# Patient Record
Sex: FEMALE | Race: BLACK OR AFRICAN AMERICAN | NOT HISPANIC OR LATINO | URBAN - METROPOLITAN AREA
[De-identification: names, ages, dates, MRNs, and addresses within clinical notes are randomized per-mention and may not be internally consistent; named-entity substitution may affect disease eponyms.]

---

## 2017-04-21 ENCOUNTER — OUTPATIENT (OUTPATIENT)
Dept: OUTPATIENT SERVICES | Facility: HOSPITAL | Age: 32
LOS: 1 days | End: 2017-04-21
Payer: COMMERCIAL

## 2017-04-21 PROCEDURE — 76641 ULTRASOUND BREAST COMPLETE: CPT

## 2017-04-21 PROCEDURE — 76641 ULTRASOUND BREAST COMPLETE: CPT | Mod: 26,50

## 2017-04-28 ENCOUNTER — OUTPATIENT (OUTPATIENT)
Dept: OUTPATIENT SERVICES | Facility: HOSPITAL | Age: 32
LOS: 1 days | End: 2017-04-28
Payer: COMMERCIAL

## 2017-04-28 PROCEDURE — 19083 BX BREAST 1ST LESION US IMAG: CPT | Mod: LT

## 2017-04-28 PROCEDURE — A4648: CPT

## 2017-04-28 PROCEDURE — 88305 TISSUE EXAM BY PATHOLOGIST: CPT

## 2017-04-28 PROCEDURE — 19083 BX BREAST 1ST LESION US IMAG: CPT

## 2017-09-20 ENCOUNTER — OUTPATIENT (OUTPATIENT)
Dept: OUTPATIENT SERVICES | Facility: HOSPITAL | Age: 32
LOS: 1 days | End: 2017-09-20
Payer: COMMERCIAL

## 2017-09-20 DIAGNOSIS — Z3A.00 WEEKS OF GESTATION OF PREGNANCY NOT SPECIFIED: ICD-10-CM

## 2017-09-20 DIAGNOSIS — O26.899 OTHER SPECIFIED PREGNANCY RELATED CONDITIONS, UNSPECIFIED TRIMESTER: ICD-10-CM

## 2017-09-20 LAB
APPEARANCE UR: CLEAR — SIGNIFICANT CHANGE UP
BILIRUB UR-MCNC: NEGATIVE — SIGNIFICANT CHANGE UP
COLOR SPEC: YELLOW — SIGNIFICANT CHANGE UP
DIFF PNL FLD: NEGATIVE — SIGNIFICANT CHANGE UP
GLUCOSE UR QL: NEGATIVE — SIGNIFICANT CHANGE UP
KETONES UR-MCNC: NEGATIVE — SIGNIFICANT CHANGE UP
LEUKOCYTE ESTERASE UR-ACNC: NEGATIVE — SIGNIFICANT CHANGE UP
NITRITE UR-MCNC: NEGATIVE — SIGNIFICANT CHANGE UP
PH UR: 7 — SIGNIFICANT CHANGE UP (ref 5–8)
PROT UR-MCNC: NEGATIVE MG/DL — SIGNIFICANT CHANGE UP
SP GR SPEC: 1.01 — SIGNIFICANT CHANGE UP (ref 1–1.03)
UROBILINOGEN FLD QL: 0.2 E.U./DL — SIGNIFICANT CHANGE UP

## 2017-09-20 PROCEDURE — 99214 OFFICE O/P EST MOD 30 MIN: CPT

## 2017-09-20 PROCEDURE — 81003 URINALYSIS AUTO W/O SCOPE: CPT

## 2017-09-20 PROCEDURE — 76818 FETAL BIOPHYS PROFILE W/NST: CPT

## 2017-09-20 PROCEDURE — 87086 URINE CULTURE/COLONY COUNT: CPT

## 2017-09-22 LAB
CULTURE RESULTS: SIGNIFICANT CHANGE UP
SPECIMEN SOURCE: SIGNIFICANT CHANGE UP

## 2017-09-25 DIAGNOSIS — O09.613 SUPERVISION OF YOUNG PRIMIGRAVIDA, THIRD TRIMESTER: ICD-10-CM

## 2017-10-22 ENCOUNTER — INPATIENT (INPATIENT)
Facility: HOSPITAL | Age: 32
LOS: 1 days | Discharge: HOME | End: 2017-10-24
Attending: OBSTETRICS & GYNECOLOGY | Admitting: OBSTETRICS & GYNECOLOGY
Payer: COMMERCIAL

## 2017-10-22 VITALS — WEIGHT: 189.6 LBS | HEIGHT: 67 IN

## 2017-10-22 DIAGNOSIS — M54.31 SCIATICA, RIGHT SIDE: ICD-10-CM

## 2017-10-22 DIAGNOSIS — Z3A.00 WEEKS OF GESTATION OF PREGNANCY NOT SPECIFIED: ICD-10-CM

## 2017-10-22 DIAGNOSIS — O26.899 OTHER SPECIFIED PREGNANCY RELATED CONDITIONS, UNSPECIFIED TRIMESTER: ICD-10-CM

## 2017-10-22 LAB
ALBUMIN SERPL ELPH-MCNC: 3.6 G/DL — SIGNIFICANT CHANGE UP (ref 3.3–5)
ALP SERPL-CCNC: 130 U/L — HIGH (ref 40–120)
ALT FLD-CCNC: 36 U/L — SIGNIFICANT CHANGE UP (ref 10–45)
ANION GAP SERPL CALC-SCNC: 17 MMOL/L — SIGNIFICANT CHANGE UP (ref 5–17)
APPEARANCE UR: CLEAR — SIGNIFICANT CHANGE UP
AST SERPL-CCNC: 45 U/L — HIGH (ref 10–40)
BASOPHILS NFR BLD AUTO: 0.2 % — SIGNIFICANT CHANGE UP (ref 0–2)
BILIRUB SERPL-MCNC: 0.2 MG/DL — SIGNIFICANT CHANGE UP (ref 0.2–1.2)
BILIRUB UR-MCNC: NEGATIVE — SIGNIFICANT CHANGE UP
BLD GP AB SCN SERPL QL: NEGATIVE — SIGNIFICANT CHANGE UP
BUN SERPL-MCNC: 10 MG/DL — SIGNIFICANT CHANGE UP (ref 7–23)
CALCIUM SERPL-MCNC: 9.1 MG/DL — SIGNIFICANT CHANGE UP (ref 8.4–10.5)
CHLORIDE SERPL-SCNC: 98 MMOL/L — SIGNIFICANT CHANGE UP (ref 96–108)
CO2 SERPL-SCNC: 21 MMOL/L — LOW (ref 22–31)
COLOR SPEC: YELLOW — SIGNIFICANT CHANGE UP
CREAT SERPL-MCNC: 0.73 MG/DL — SIGNIFICANT CHANGE UP (ref 0.5–1.3)
DIFF PNL FLD: NEGATIVE — SIGNIFICANT CHANGE UP
EOSINOPHIL NFR BLD AUTO: 0.6 % — SIGNIFICANT CHANGE UP (ref 0–6)
GLUCOSE SERPL-MCNC: 101 MG/DL — HIGH (ref 70–99)
GLUCOSE UR QL: NEGATIVE — SIGNIFICANT CHANGE UP
HCT VFR BLD CALC: 37.5 % — SIGNIFICANT CHANGE UP (ref 34.5–45)
HGB BLD-MCNC: 12.2 G/DL — SIGNIFICANT CHANGE UP (ref 11.5–15.5)
KETONES UR-MCNC: 15 MG/DL
LDH SERPL L TO P-CCNC: 363 U/L — HIGH (ref 50–242)
LEUKOCYTE ESTERASE UR-ACNC: NEGATIVE — SIGNIFICANT CHANGE UP
LYMPHOCYTES # BLD AUTO: 20.5 % — SIGNIFICANT CHANGE UP (ref 13–44)
MCHC RBC-ENTMCNC: 27.7 PG — SIGNIFICANT CHANGE UP (ref 27–34)
MCHC RBC-ENTMCNC: 32.5 G/DL — SIGNIFICANT CHANGE UP (ref 32–36)
MCV RBC AUTO: 85 FL — SIGNIFICANT CHANGE UP (ref 80–100)
MONOCYTES NFR BLD AUTO: 9.3 % — SIGNIFICANT CHANGE UP (ref 2–14)
NEUTROPHILS NFR BLD AUTO: 69.4 % — SIGNIFICANT CHANGE UP (ref 43–77)
NITRITE UR-MCNC: NEGATIVE — SIGNIFICANT CHANGE UP
PH UR: 7 — SIGNIFICANT CHANGE UP (ref 5–8)
PLATELET # BLD AUTO: 146 K/UL — LOW (ref 150–400)
POTASSIUM SERPL-MCNC: 3.8 MMOL/L — SIGNIFICANT CHANGE UP (ref 3.5–5.3)
POTASSIUM SERPL-SCNC: 3.8 MMOL/L — SIGNIFICANT CHANGE UP (ref 3.5–5.3)
PROT SERPL-MCNC: 7 G/DL — SIGNIFICANT CHANGE UP (ref 6–8.3)
PROT UR-MCNC: (no result) MG/DL
RBC # BLD: 4.41 M/UL — SIGNIFICANT CHANGE UP (ref 3.8–5.2)
RBC # FLD: 12.9 % — SIGNIFICANT CHANGE UP (ref 10.3–16.9)
RH IG SCN BLD-IMP: POSITIVE — SIGNIFICANT CHANGE UP
RH IG SCN BLD-IMP: POSITIVE — SIGNIFICANT CHANGE UP
SODIUM SERPL-SCNC: 136 MMOL/L — SIGNIFICANT CHANGE UP (ref 135–145)
SP GR SPEC: 1.02 — SIGNIFICANT CHANGE UP (ref 1–1.03)
URATE SERPL-MCNC: 4.6 MG/DL — SIGNIFICANT CHANGE UP (ref 2.5–7)
UROBILINOGEN FLD QL: 0.2 E.U./DL — SIGNIFICANT CHANGE UP
WBC # BLD: 8.7 K/UL — SIGNIFICANT CHANGE UP (ref 3.8–10.5)
WBC # FLD AUTO: 8.7 K/UL — SIGNIFICANT CHANGE UP (ref 3.8–10.5)

## 2017-10-22 RX ORDER — OXYTOCIN 10 UNIT/ML
41.67 VIAL (ML) INJECTION
Qty: 20 | Refills: 0 | Status: DISCONTINUED | OUTPATIENT
Start: 2017-10-22 | End: 2017-10-24

## 2017-10-22 RX ORDER — SODIUM CHLORIDE 9 MG/ML
1000 INJECTION, SOLUTION INTRAVENOUS
Qty: 0 | Refills: 0 | Status: DISCONTINUED | OUTPATIENT
Start: 2017-10-22 | End: 2017-10-22

## 2017-10-22 RX ORDER — LANOLIN
1 OINTMENT (GRAM) TOPICAL EVERY 6 HOURS
Qty: 0 | Refills: 0 | Status: DISCONTINUED | OUTPATIENT
Start: 2017-10-22 | End: 2017-10-24

## 2017-10-22 RX ORDER — OXYTOCIN 10 UNIT/ML
333.33 VIAL (ML) INJECTION
Qty: 20 | Refills: 0 | Status: DISCONTINUED | OUTPATIENT
Start: 2017-10-22 | End: 2017-10-22

## 2017-10-22 RX ORDER — IBUPROFEN 200 MG
600 TABLET ORAL EVERY 6 HOURS
Qty: 0 | Refills: 0 | Status: DISCONTINUED | OUTPATIENT
Start: 2017-10-22 | End: 2017-10-24

## 2017-10-22 RX ORDER — ACETAMINOPHEN 500 MG
650 TABLET ORAL EVERY 6 HOURS
Qty: 0 | Refills: 0 | Status: DISCONTINUED | OUTPATIENT
Start: 2017-10-22 | End: 2017-10-24

## 2017-10-22 RX ORDER — DIBUCAINE 1 %
1 OINTMENT (GRAM) RECTAL EVERY 4 HOURS
Qty: 0 | Refills: 0 | Status: DISCONTINUED | OUTPATIENT
Start: 2017-10-22 | End: 2017-10-24

## 2017-10-22 RX ORDER — PENICILLIN G POTASSIUM 5000000 [IU]/1
5 POWDER, FOR SOLUTION INTRAMUSCULAR; INTRAPLEURAL; INTRATHECAL; INTRAVENOUS ONCE
Qty: 0 | Refills: 0 | Status: COMPLETED | OUTPATIENT
Start: 2017-10-22 | End: 2017-10-22

## 2017-10-22 RX ORDER — HYDROCORTISONE 1 %
1 OINTMENT (GRAM) TOPICAL EVERY 4 HOURS
Qty: 0 | Refills: 0 | Status: DISCONTINUED | OUTPATIENT
Start: 2017-10-22 | End: 2017-10-24

## 2017-10-22 RX ORDER — SODIUM CHLORIDE 9 MG/ML
1000 INJECTION, SOLUTION INTRAVENOUS ONCE
Qty: 0 | Refills: 0 | Status: COMPLETED | OUTPATIENT
Start: 2017-10-22 | End: 2017-10-22

## 2017-10-22 RX ORDER — PENICILLIN G POTASSIUM 5000000 [IU]/1
POWDER, FOR SOLUTION INTRAMUSCULAR; INTRAPLEURAL; INTRATHECAL; INTRAVENOUS
Qty: 0 | Refills: 0 | Status: DISCONTINUED | OUTPATIENT
Start: 2017-10-22 | End: 2017-10-24

## 2017-10-22 RX ORDER — PENICILLIN G POTASSIUM 5000000 [IU]/1
2.5 POWDER, FOR SOLUTION INTRAMUSCULAR; INTRAPLEURAL; INTRATHECAL; INTRAVENOUS EVERY 4 HOURS
Qty: 0 | Refills: 0 | Status: DISCONTINUED | OUTPATIENT
Start: 2017-10-22 | End: 2017-10-24

## 2017-10-22 RX ORDER — DOCUSATE SODIUM 100 MG
100 CAPSULE ORAL
Qty: 0 | Refills: 0 | Status: DISCONTINUED | OUTPATIENT
Start: 2017-10-22 | End: 2017-10-24

## 2017-10-22 RX ORDER — CITRIC ACID/SODIUM CITRATE 300-500 MG
15 SOLUTION, ORAL ORAL EVERY 4 HOURS
Qty: 0 | Refills: 0 | Status: DISCONTINUED | OUTPATIENT
Start: 2017-10-22 | End: 2017-10-22

## 2017-10-22 RX ORDER — SIMETHICONE 80 MG/1
80 TABLET, CHEWABLE ORAL EVERY 6 HOURS
Qty: 0 | Refills: 0 | Status: DISCONTINUED | OUTPATIENT
Start: 2017-10-22 | End: 2017-10-24

## 2017-10-22 RX ORDER — SODIUM CHLORIDE 9 MG/ML
3 INJECTION INTRAMUSCULAR; INTRAVENOUS; SUBCUTANEOUS EVERY 8 HOURS
Qty: 0 | Refills: 0 | Status: DISCONTINUED | OUTPATIENT
Start: 2017-10-22 | End: 2017-10-24

## 2017-10-22 RX ORDER — AER TRAVELER 0.5 G/1
1 SOLUTION RECTAL; TOPICAL EVERY 4 HOURS
Qty: 0 | Refills: 0 | Status: DISCONTINUED | OUTPATIENT
Start: 2017-10-22 | End: 2017-10-24

## 2017-10-22 RX ORDER — TETANUS TOXOID, REDUCED DIPHTHERIA TOXOID AND ACELLULAR PERTUSSIS VACCINE, ADSORBED 5; 2.5; 8; 8; 2.5 [IU]/.5ML; [IU]/.5ML; UG/.5ML; UG/.5ML; UG/.5ML
0.5 SUSPENSION INTRAMUSCULAR ONCE
Qty: 0 | Refills: 0 | Status: DISCONTINUED | OUTPATIENT
Start: 2017-10-22 | End: 2017-10-24

## 2017-10-22 RX ORDER — BENZOCAINE 10 %
1 GEL (GRAM) MUCOUS MEMBRANE EVERY 6 HOURS
Qty: 0 | Refills: 0 | Status: DISCONTINUED | OUTPATIENT
Start: 2017-10-22 | End: 2017-10-24

## 2017-10-22 RX ORDER — OXYCODONE AND ACETAMINOPHEN 5; 325 MG/1; MG/1
2 TABLET ORAL EVERY 6 HOURS
Qty: 0 | Refills: 0 | Status: DISCONTINUED | OUTPATIENT
Start: 2017-10-22 | End: 2017-10-24

## 2017-10-22 RX ORDER — OXYCODONE AND ACETAMINOPHEN 5; 325 MG/1; MG/1
1 TABLET ORAL
Qty: 0 | Refills: 0 | Status: DISCONTINUED | OUTPATIENT
Start: 2017-10-22 | End: 2017-10-22

## 2017-10-22 RX ADMIN — Medication 600 MILLIGRAM(S): at 13:44

## 2017-10-22 RX ADMIN — SODIUM CHLORIDE 3 MILLILITER(S): 9 INJECTION INTRAMUSCULAR; INTRAVENOUS; SUBCUTANEOUS at 16:57

## 2017-10-22 RX ADMIN — Medication 600 MILLIGRAM(S): at 14:15

## 2017-10-22 RX ADMIN — SODIUM CHLORIDE 125 MILLILITER(S): 9 INJECTION, SOLUTION INTRAVENOUS at 06:50

## 2017-10-22 RX ADMIN — Medication 600 MILLIGRAM(S): at 19:46

## 2017-10-22 RX ADMIN — PENICILLIN G POTASSIUM 200 MILLION UNIT(S): 5000000 POWDER, FOR SOLUTION INTRAMUSCULAR; INTRAPLEURAL; INTRATHECAL; INTRAVENOUS at 10:10

## 2017-10-22 RX ADMIN — Medication 1 TABLET(S): at 17:51

## 2017-10-22 RX ADMIN — Medication 1 SPRAY(S): at 19:46

## 2017-10-22 RX ADMIN — SODIUM CHLORIDE 125 MILLILITER(S): 9 INJECTION, SOLUTION INTRAVENOUS at 07:25

## 2017-10-22 RX ADMIN — Medication 1 APPLICATION(S): at 19:46

## 2017-10-22 RX ADMIN — Medication 650 MILLIGRAM(S): at 18:30

## 2017-10-22 RX ADMIN — Medication 650 MILLIGRAM(S): at 17:53

## 2017-10-22 RX ADMIN — Medication 125 MILLIUNIT(S)/MIN: at 10:38

## 2017-10-22 RX ADMIN — SODIUM CHLORIDE 2000 MILLILITER(S): 9 INJECTION, SOLUTION INTRAVENOUS at 06:01

## 2017-10-22 RX ADMIN — PENICILLIN G POTASSIUM 200 MILLION UNIT(S): 5000000 POWDER, FOR SOLUTION INTRAMUSCULAR; INTRAPLEURAL; INTRATHECAL; INTRAVENOUS at 06:18

## 2017-10-23 LAB
ALBUMIN SERPL ELPH-MCNC: 3.2 G/DL — LOW (ref 3.3–5)
ALP SERPL-CCNC: 97 U/L — SIGNIFICANT CHANGE UP (ref 40–120)
ALT FLD-CCNC: 59 U/L — HIGH (ref 10–45)
ANION GAP SERPL CALC-SCNC: 11 MMOL/L — SIGNIFICANT CHANGE UP (ref 5–17)
AST SERPL-CCNC: 68 U/L — HIGH (ref 10–40)
BILIRUB SERPL-MCNC: 0.2 MG/DL — SIGNIFICANT CHANGE UP (ref 0.2–1.2)
BUN SERPL-MCNC: 7 MG/DL — SIGNIFICANT CHANGE UP (ref 7–23)
CALCIUM SERPL-MCNC: 9.3 MG/DL — SIGNIFICANT CHANGE UP (ref 8.4–10.5)
CHLORIDE SERPL-SCNC: 97 MMOL/L — SIGNIFICANT CHANGE UP (ref 96–108)
CO2 SERPL-SCNC: 28 MMOL/L — SIGNIFICANT CHANGE UP (ref 22–31)
CREAT SERPL-MCNC: 0.8 MG/DL — SIGNIFICANT CHANGE UP (ref 0.5–1.3)
GLUCOSE SERPL-MCNC: 91 MG/DL — SIGNIFICANT CHANGE UP (ref 70–99)
HCT VFR BLD CALC: 35.9 % — SIGNIFICANT CHANGE UP (ref 34.5–45)
HGB BLD-MCNC: 11.5 G/DL — SIGNIFICANT CHANGE UP (ref 11.5–15.5)
LDH SERPL L TO P-CCNC: 352 U/L — HIGH (ref 50–242)
MCHC RBC-ENTMCNC: 27.7 PG — SIGNIFICANT CHANGE UP (ref 27–34)
MCHC RBC-ENTMCNC: 32 G/DL — SIGNIFICANT CHANGE UP (ref 32–36)
MCV RBC AUTO: 86.5 FL — SIGNIFICANT CHANGE UP (ref 80–100)
PLATELET # BLD AUTO: 141 K/UL — LOW (ref 150–400)
POTASSIUM SERPL-MCNC: 4.2 MMOL/L — SIGNIFICANT CHANGE UP (ref 3.5–5.3)
POTASSIUM SERPL-SCNC: 4.2 MMOL/L — SIGNIFICANT CHANGE UP (ref 3.5–5.3)
PROT SERPL-MCNC: 6.3 G/DL — SIGNIFICANT CHANGE UP (ref 6–8.3)
RBC # BLD: 4.15 M/UL — SIGNIFICANT CHANGE UP (ref 3.8–5.2)
RBC # FLD: 13 % — SIGNIFICANT CHANGE UP (ref 10.3–16.9)
SODIUM SERPL-SCNC: 136 MMOL/L — SIGNIFICANT CHANGE UP (ref 135–145)
T PALLIDUM AB TITR SER: NEGATIVE — SIGNIFICANT CHANGE UP
URATE SERPL-MCNC: 4.8 MG/DL — SIGNIFICANT CHANGE UP (ref 2.5–7)
WBC # BLD: 11.6 K/UL — HIGH (ref 3.8–10.5)
WBC # FLD AUTO: 11.6 K/UL — HIGH (ref 3.8–10.5)

## 2017-10-23 RX ORDER — LABETALOL HCL 100 MG
200 TABLET ORAL EVERY 12 HOURS
Qty: 0 | Refills: 0 | Status: DISCONTINUED | OUTPATIENT
Start: 2017-10-23 | End: 2017-10-24

## 2017-10-23 RX ADMIN — Medication 200 MILLIGRAM(S): at 21:13

## 2017-10-23 RX ADMIN — SODIUM CHLORIDE 3 MILLILITER(S): 9 INJECTION INTRAMUSCULAR; INTRAVENOUS; SUBCUTANEOUS at 01:03

## 2017-10-23 RX ADMIN — Medication 600 MILLIGRAM(S): at 20:30

## 2017-10-23 RX ADMIN — Medication 100 MILLIGRAM(S): at 00:05

## 2017-10-23 RX ADMIN — OXYCODONE AND ACETAMINOPHEN 2 TABLET(S): 5; 325 TABLET ORAL at 06:50

## 2017-10-23 RX ADMIN — OXYCODONE AND ACETAMINOPHEN 2 TABLET(S): 5; 325 TABLET ORAL at 18:37

## 2017-10-23 RX ADMIN — OXYCODONE AND ACETAMINOPHEN 2 TABLET(S): 5; 325 TABLET ORAL at 12:09

## 2017-10-23 RX ADMIN — Medication 100 MILLIGRAM(S): at 12:09

## 2017-10-23 RX ADMIN — OXYCODONE AND ACETAMINOPHEN 2 TABLET(S): 5; 325 TABLET ORAL at 13:00

## 2017-10-23 RX ADMIN — OXYCODONE AND ACETAMINOPHEN 2 TABLET(S): 5; 325 TABLET ORAL at 19:30

## 2017-10-23 RX ADMIN — Medication 1 TABLET(S): at 12:09

## 2017-10-23 RX ADMIN — SODIUM CHLORIDE 3 MILLILITER(S): 9 INJECTION INTRAMUSCULAR; INTRAVENOUS; SUBCUTANEOUS at 18:38

## 2017-10-23 RX ADMIN — OXYCODONE AND ACETAMINOPHEN 2 TABLET(S): 5; 325 TABLET ORAL at 06:09

## 2017-10-23 RX ADMIN — OXYCODONE AND ACETAMINOPHEN 2 TABLET(S): 5; 325 TABLET ORAL at 00:50

## 2017-10-23 RX ADMIN — OXYCODONE AND ACETAMINOPHEN 2 TABLET(S): 5; 325 TABLET ORAL at 00:05

## 2017-10-23 RX ADMIN — SODIUM CHLORIDE 3 MILLILITER(S): 9 INJECTION INTRAMUSCULAR; INTRAVENOUS; SUBCUTANEOUS at 05:47

## 2017-10-23 NOTE — PROGRESS NOTE ADULT - SUBJECTIVE AND OBJECTIVE BOX
Patient evaluated at bedside.  No acute events overnight.  She reports pain is well controlled with motrin and percocet.  She denies heavy vaginal bleeding or perineal discomfort.  She has been ambulating without assistance, voiding spontaneously, and is breastfeeding.  Denies headache, blurry vision, SOB, epigastric/RUQ pain.    Physical Exam:  T(C): 37 (10-23-17 @ 05:00), Max: 37 (10-23-17 @ 05:00)  HR: 73 (10-23-17 @ 05:00) (73 - 91)  BP: 131/86 (10-23-17 @ 05:00) (131/86 - 159/91)  RR: 18 (10-23-17 @ 05:00) (18 - 18)  SpO2: 97% (10-23-17 @ 05:00) (97% - 100%)  Wt(kg): --    GA: NAD, AAOx3  Abd: soft, nontender, nondistended, no rebound or guarding, uterus firm at midline,   fundus below umbilicus  : lochia WNL  Extremities: no swelling or calf tenderness                            12.2   8.7   )-----------( 146      ( 22 Oct 2017 06:17 )             37.5     10-22    136  |  98  |  10  ----------------------------<  101<H>  3.8   |  21<L>  |  0.73    Ca    9.1      22 Oct 2017 06:17    TPro  7.0  /  Alb  3.6  /  TBili  0.2  /  DBili  x   /  AST  45<H>  /  ALT  36  /  AlkPhos  130<H>  10-22

## 2017-10-23 NOTE — PROGRESS NOTE ADULT - ASSESSMENT
A/P  32y   s/p , PPD #1 with gHTN  ,stable  1.  Gestational hypertension -- mild range pressures overnight; will consider starting PO anti-hypertensive medication   2. Pain: well controlled on OPM  3. GI: Regular diet  4. : voiding spontaneously  5. DVT prophylaxis: Ambulation  6. Dispo: PPD 2, unless otherwise specified

## 2017-10-23 NOTE — PROGRESS NOTE ADULT - SUBJECTIVE AND OBJECTIVE BOX
Patient evaluated at bedside.   She reports pain is well controlled with Motrin.  She denies headache, dizziness, chest pain, palpitations, shortness of breathe, nausea, vomiting, heavy vaginal bleeding or perineal discomfort.  She has been ambulating without assistance, voiding spontaneously, and is breastfeeding.  Tolerating regular diet    Physical Exam:  VSS:   GA: NAD, A+0 x 3  CV: RRR  Pulm: CTAB  Breasts: soft, nontender, no palpable masses  Abd: + BS, soft, nontender, nondistended, no rebound or guarding, uterus firm at midline,   fb below umbilicus  : lochia WNL; Perineum: healing well  Extremities: no swelling or calf tenderness, reflexes +2 bilaterally                       12.2   8.7   )-----------( 146      ( 22 Oct 2017 06:17 )             37.5     10-22    136  |  98  |  10  ----------------------------<  101<H>  3.8   |  21<L>  |  0.73    Ca    9.1      22 Oct 2017 06:17    TPro  7.0  /  Alb  3.6  /  TBili  0.2  /  DBili  x   /  AST  45<H>  /  ALT  36  /  AlkPhos  130<H>  10-22    Assessment:   PPD #  1 S/P  over midline episiotomy  Plan:  Supportive Care  Diet as tolerated  Oral pain Meds

## 2017-10-24 ENCOUNTER — TRANSCRIPTION ENCOUNTER (OUTPATIENT)
Age: 32
End: 2017-10-24

## 2017-10-24 VITALS
RESPIRATION RATE: 18 BRPM | DIASTOLIC BLOOD PRESSURE: 80 MMHG | TEMPERATURE: 98 F | SYSTOLIC BLOOD PRESSURE: 117 MMHG | HEART RATE: 88 BPM | OXYGEN SATURATION: 99 %

## 2017-10-24 LAB
ALBUMIN SERPL ELPH-MCNC: 3.2 G/DL — LOW (ref 3.3–5)
ALP SERPL-CCNC: 94 U/L — SIGNIFICANT CHANGE UP (ref 40–120)
ALT FLD-CCNC: 78 U/L — HIGH (ref 10–45)
ANION GAP SERPL CALC-SCNC: 10 MMOL/L — SIGNIFICANT CHANGE UP (ref 5–17)
AST SERPL-CCNC: 76 U/L — HIGH (ref 10–40)
BASOPHILS NFR BLD AUTO: 0.1 % — SIGNIFICANT CHANGE UP (ref 0–2)
BILIRUB SERPL-MCNC: 0.2 MG/DL — SIGNIFICANT CHANGE UP (ref 0.2–1.2)
BUN SERPL-MCNC: 5 MG/DL — LOW (ref 7–23)
CALCIUM SERPL-MCNC: 9.1 MG/DL — SIGNIFICANT CHANGE UP (ref 8.4–10.5)
CHLORIDE SERPL-SCNC: 98 MMOL/L — SIGNIFICANT CHANGE UP (ref 96–108)
CO2 SERPL-SCNC: 28 MMOL/L — SIGNIFICANT CHANGE UP (ref 22–31)
CREAT SERPL-MCNC: 0.78 MG/DL — SIGNIFICANT CHANGE UP (ref 0.5–1.3)
EOSINOPHIL NFR BLD AUTO: 0.7 % — SIGNIFICANT CHANGE UP (ref 0–6)
GLUCOSE SERPL-MCNC: 84 MG/DL — SIGNIFICANT CHANGE UP (ref 70–99)
HCT VFR BLD CALC: 35 % — SIGNIFICANT CHANGE UP (ref 34.5–45)
HGB BLD-MCNC: 11.1 G/DL — LOW (ref 11.5–15.5)
LDH SERPL L TO P-CCNC: 344 U/L — HIGH (ref 50–242)
LYMPHOCYTES # BLD AUTO: 11.9 % — LOW (ref 13–44)
MCHC RBC-ENTMCNC: 27.3 PG — SIGNIFICANT CHANGE UP (ref 27–34)
MCHC RBC-ENTMCNC: 31.7 G/DL — LOW (ref 32–36)
MCV RBC AUTO: 86 FL — SIGNIFICANT CHANGE UP (ref 80–100)
MONOCYTES NFR BLD AUTO: 7.3 % — SIGNIFICANT CHANGE UP (ref 2–14)
NEUTROPHILS NFR BLD AUTO: 80 % — HIGH (ref 43–77)
PLATELET # BLD AUTO: 139 K/UL — LOW (ref 150–400)
POTASSIUM SERPL-MCNC: 3.8 MMOL/L — SIGNIFICANT CHANGE UP (ref 3.5–5.3)
POTASSIUM SERPL-SCNC: 3.8 MMOL/L — SIGNIFICANT CHANGE UP (ref 3.5–5.3)
PROT SERPL-MCNC: 6.2 G/DL — SIGNIFICANT CHANGE UP (ref 6–8.3)
RBC # BLD: 4.07 M/UL — SIGNIFICANT CHANGE UP (ref 3.8–5.2)
RBC # FLD: 12.8 % — SIGNIFICANT CHANGE UP (ref 10.3–16.9)
SODIUM SERPL-SCNC: 136 MMOL/L — SIGNIFICANT CHANGE UP (ref 135–145)
URATE SERPL-MCNC: 5.3 — SIGNIFICANT CHANGE UP
WBC # BLD: 10.4 K/UL — SIGNIFICANT CHANGE UP (ref 3.8–10.5)
WBC # FLD AUTO: 10.4 K/UL — SIGNIFICANT CHANGE UP (ref 3.8–10.5)

## 2017-10-24 PROCEDURE — 83615 LACTATE (LD) (LDH) ENZYME: CPT

## 2017-10-24 PROCEDURE — 99214 OFFICE O/P EST MOD 30 MIN: CPT

## 2017-10-24 PROCEDURE — 86780 TREPONEMA PALLIDUM: CPT

## 2017-10-24 PROCEDURE — 85027 COMPLETE CBC AUTOMATED: CPT

## 2017-10-24 PROCEDURE — 84550 ASSAY OF BLOOD/URIC ACID: CPT

## 2017-10-24 PROCEDURE — 86901 BLOOD TYPING SEROLOGIC RH(D): CPT

## 2017-10-24 PROCEDURE — 86850 RBC ANTIBODY SCREEN: CPT

## 2017-10-24 PROCEDURE — 81001 URINALYSIS AUTO W/SCOPE: CPT

## 2017-10-24 PROCEDURE — 86900 BLOOD TYPING SEROLOGIC ABO: CPT

## 2017-10-24 PROCEDURE — 80053 COMPREHEN METABOLIC PANEL: CPT

## 2017-10-24 PROCEDURE — 85025 COMPLETE CBC W/AUTO DIFF WBC: CPT

## 2017-10-24 PROCEDURE — 36415 COLL VENOUS BLD VENIPUNCTURE: CPT

## 2017-10-24 RX ORDER — FERROUS SULFATE 325(65) MG
0 TABLET ORAL
Qty: 0 | Refills: 0 | COMMUNITY

## 2017-10-24 RX ADMIN — SODIUM CHLORIDE 3 MILLILITER(S): 9 INJECTION INTRAMUSCULAR; INTRAVENOUS; SUBCUTANEOUS at 13:08

## 2017-10-24 RX ADMIN — OXYCODONE AND ACETAMINOPHEN 2 TABLET(S): 5; 325 TABLET ORAL at 01:00

## 2017-10-24 RX ADMIN — SODIUM CHLORIDE 3 MILLILITER(S): 9 INJECTION INTRAMUSCULAR; INTRAVENOUS; SUBCUTANEOUS at 00:49

## 2017-10-24 RX ADMIN — Medication 650 MILLIGRAM(S): at 08:00

## 2017-10-24 RX ADMIN — SIMETHICONE 80 MILLIGRAM(S): 80 TABLET, CHEWABLE ORAL at 09:29

## 2017-10-24 RX ADMIN — Medication 2 TABLET(S): at 13:05

## 2017-10-24 RX ADMIN — Medication 650 MILLIGRAM(S): at 07:18

## 2017-10-24 RX ADMIN — Medication 1 TABLET(S): at 13:05

## 2017-10-24 RX ADMIN — Medication 2 TABLET(S): at 13:45

## 2017-10-24 RX ADMIN — SODIUM CHLORIDE 3 MILLILITER(S): 9 INJECTION INTRAMUSCULAR; INTRAVENOUS; SUBCUTANEOUS at 06:57

## 2017-10-24 RX ADMIN — OXYCODONE AND ACETAMINOPHEN 2 TABLET(S): 5; 325 TABLET ORAL at 00:19

## 2017-10-24 RX ADMIN — Medication 200 MILLIGRAM(S): at 09:29

## 2017-10-24 NOTE — LACTATION INITIAL EVALUATION - NS LACT CON REASON FOR REQ
sleepy baby x24 hours of age/day 3 of life, mother reports baby wakes for feeds at night but feedings are less than 10 minutes. does not wake during the day to feed. baby positioned s2s with mother and attempted to stimulate baby with spoonfull of colostrum, touch and finger suck. baby very difficult to wake. with full assist, baby latched while mom laid back onto both breasts with deep attachment and visible swallow. maintained for 2-3min per side, then seemed to tire and slip off. voids/stools approp 6% wt loss, 39 4/7wk. mom advised that if baby remains difficult to wake/feed q2-3h she may begin pumping/hand expression 15-20min q2-3hr supplementing with 15-20ml colostrum/formula until baby waking easily and feeding actively consistently. taught hand expression with return demo. primary nurse will provide pump instructions. reviewed signs of a good latch/effective feeding/approp # voids/stools. advised f/u with IBCLC resources provided as well as triple feeding care plan./primaparous mom

## 2017-10-24 NOTE — PROGRESS NOTE ADULT - ATTENDING COMMENTS
Pt seen and examined - BPs impoved with labetalol 200mg bid. Asymptomatic - no toxic complaints  Will await labs this morning - if stable, cleared to go home  Will follow up in the office in 2 weeks. BP kit and labetalol rx sent to pt's pharmacy - instructions for when to call d/w pt

## 2017-10-24 NOTE — LACTATION INITIAL EVALUATION - INFANT FEEDING PLAN COMMENT, OB PROFILE
s2s, feeding on demand 8-12x/day. if baby remains sleepy/unwilling to latch/unable to maintain latch for minimum 10-15min mom aware to begin triple feeding care plan.

## 2017-10-24 NOTE — PROGRESS NOTE ADULT - SUBJECTIVE AND OBJECTIVE BOX
Patient evaluated at bedside.  No acute events overnight.  She reports pain is well controlled with OPM.  She denies heavy vaginal bleeding or perineal discomfort.  She has been ambulating without assistance, voiding spontaneously, and is breastfeeding.  Currently has a headache that started around midnight last night.  Has taken percocet which hasn't helped.  Has history of migraines.      Physical Exam:  T(C): 36.8 (10-24-17 @ 04:30), Max: 37 (10-24-17 @ 00:30)  HR: 96 (10-24-17 @ 04:30) (96 - 98)  BP: 130/78 (10-24-17 @ 04:30) (120/75 - 155/103)  RR: 18 (10-24-17 @ 04:30) (18 - 18)  SpO2: 97% (10-24-17 @ 04:30) (97% - 100%)  Wt(kg): --    GA: NAD, AAOx3  Abd: soft, nontender, nondistended, no rebound or guarding, uterus firm at midline,   fundus below umbilicus  : lochia WNL  Extremities: no swelling or calf tenderness                            11.5   11.6  )-----------( 141      ( 23 Oct 2017 21:05 )             35.9     10-23    136  |  97  |  7   ----------------------------<  91  4.2   |  28  |  0.80    Ca    9.3      23 Oct 2017 21:05    TPro  6.3  /  Alb  3.2<L>  /  TBili  0.2  /  DBili  x   /  AST  68<H>  /  ALT  59<H>  /  AlkPhos  97  10-23

## 2017-10-24 NOTE — DISCHARGE NOTE OB - CARE PROVIDER_API CALL
Lucero Lorenzo), Obstetrics and Gynecology  [address] [place] [address]  Phone: [phone]  Fax: [phone] Lucero Lorenzo), Obstetrics and Gynecology  [address] [place] [address]  Phone: [phone]  Fax: [phone]

## 2017-10-24 NOTE — DISCHARGE NOTE OB - PATIENT PORTAL LINK FT
“You can access the [facility] Patient Portal, offered by [facility], by registering with the following website: [email or web address]”

## 2017-10-24 NOTE — DISCHARGE NOTE OB - HOSPITAL COURSE
Uncomplicated vaginal delivery.  Given patients gestational hypertension with elevated blood pressrues in the mild range noted in the postpartum period, patient was started on labetalol 200mg twice daily.  Blood pressure was well controlled on labetalol.  Headache symptoms resolved with Tylenol and Fioricet.

## 2017-10-24 NOTE — LACTATION INITIAL EVALUATION - LACTATION INTERVENTIONS
initiate hand expression routine/initiate dual electric pump routine/triple feeding care plan if baby remains sleepy, unable to latch consistently 8-12x/day/initiate skin to skin

## 2017-10-24 NOTE — PROGRESS NOTE ADULT - ASSESSMENT
A/P  32y   s/p , PPD #2  ,stable  1. hypertension -- gHTN vs. PEC ; BP well controlled on labetalol.  Will contineu to monitor BP today.  Will administer fioricet for headache and monitor.  2.  Pain: well controlled on OPM  2. GI: Regular diet  3. : voiding spontaneously  4. DVT prophylaxis: Ambulation  5. Dispo: PPD 2, unless otherwise specified

## 2017-10-27 DIAGNOSIS — O13.3 GESTATIONAL [PREGNANCY-INDUCED] HYPERTENSION WITHOUT SIGNIFICANT PROTEINURIA, THIRD TRIMESTER: ICD-10-CM

## 2017-10-27 DIAGNOSIS — O26.899 OTHER SPECIFIED PREGNANCY RELATED CONDITIONS, UNSPECIFIED TRIMESTER: ICD-10-CM

## 2017-10-27 DIAGNOSIS — Z3A.39 39 WEEKS GESTATION OF PREGNANCY: ICD-10-CM

## 2017-10-27 DIAGNOSIS — Z3A.00 WEEKS OF GESTATION OF PREGNANCY NOT SPECIFIED: ICD-10-CM

## 2017-10-30 ENCOUNTER — INPATIENT (INPATIENT)
Facility: HOSPITAL | Age: 32
LOS: 1 days | Discharge: HOME | DRG: 776 | End: 2017-11-01
Attending: OBSTETRICS & GYNECOLOGY | Admitting: OBSTETRICS & GYNECOLOGY
Payer: COMMERCIAL

## 2017-10-30 VITALS
RESPIRATION RATE: 18 BRPM | DIASTOLIC BLOOD PRESSURE: 81 MMHG | HEART RATE: 97 BPM | OXYGEN SATURATION: 98 % | SYSTOLIC BLOOD PRESSURE: 143 MMHG

## 2017-10-30 DIAGNOSIS — Z3A.00 WEEKS OF GESTATION OF PREGNANCY NOT SPECIFIED: ICD-10-CM

## 2017-10-30 DIAGNOSIS — O26.899 OTHER SPECIFIED PREGNANCY RELATED CONDITIONS, UNSPECIFIED TRIMESTER: ICD-10-CM

## 2017-10-30 LAB
ALBUMIN SERPL ELPH-MCNC: 3.7 G/DL — SIGNIFICANT CHANGE UP (ref 3.3–5)
ALP SERPL-CCNC: 86 U/L — SIGNIFICANT CHANGE UP (ref 40–120)
ALT FLD-CCNC: 99 U/L — HIGH (ref 10–45)
ANION GAP SERPL CALC-SCNC: 12 MMOL/L — SIGNIFICANT CHANGE UP (ref 5–17)
APPEARANCE UR: CLEAR — SIGNIFICANT CHANGE UP
APTT BLD: 27.6 SEC — SIGNIFICANT CHANGE UP (ref 27.5–37.4)
AST SERPL-CCNC: 65 U/L — HIGH (ref 10–40)
BASOPHILS NFR BLD AUTO: 0.5 % — SIGNIFICANT CHANGE UP (ref 0–2)
BILIRUB SERPL-MCNC: 0.2 MG/DL — SIGNIFICANT CHANGE UP (ref 0.2–1.2)
BILIRUB UR-MCNC: NEGATIVE — SIGNIFICANT CHANGE UP
BUN SERPL-MCNC: 8 MG/DL — SIGNIFICANT CHANGE UP (ref 7–23)
CALCIUM SERPL-MCNC: 9.2 MG/DL — SIGNIFICANT CHANGE UP (ref 8.4–10.5)
CHLORIDE SERPL-SCNC: 103 MMOL/L — SIGNIFICANT CHANGE UP (ref 96–108)
CO2 SERPL-SCNC: 25 MMOL/L — SIGNIFICANT CHANGE UP (ref 22–31)
COLOR SPEC: YELLOW — SIGNIFICANT CHANGE UP
CREAT SERPL-MCNC: 0.75 MG/DL — SIGNIFICANT CHANGE UP (ref 0.5–1.3)
DIFF PNL FLD: (no result)
EOSINOPHIL NFR BLD AUTO: 2.4 % — SIGNIFICANT CHANGE UP (ref 0–6)
FIBRINOGEN PPP-MCNC: 338 MG/DL — SIGNIFICANT CHANGE UP (ref 258–438)
GLUCOSE SERPL-MCNC: 102 MG/DL — HIGH (ref 70–99)
GLUCOSE UR QL: NEGATIVE — SIGNIFICANT CHANGE UP
HCT VFR BLD CALC: 36.3 % — SIGNIFICANT CHANGE UP (ref 34.5–45)
HGB BLD-MCNC: 11.9 G/DL — SIGNIFICANT CHANGE UP (ref 11.5–15.5)
INR BLD: 1 — SIGNIFICANT CHANGE UP (ref 0.88–1.16)
KETONES UR-MCNC: NEGATIVE — SIGNIFICANT CHANGE UP
LDH SERPL L TO P-CCNC: 418 U/L — HIGH (ref 50–242)
LEUKOCYTE ESTERASE UR-ACNC: (no result)
LYMPHOCYTES # BLD AUTO: 17.6 % — SIGNIFICANT CHANGE UP (ref 13–44)
MAGNESIUM SERPL-MCNC: 4.4 MG/DL — HIGH (ref 1.6–2.6)
MCHC RBC-ENTMCNC: 28 PG — SIGNIFICANT CHANGE UP (ref 27–34)
MCHC RBC-ENTMCNC: 32.8 G/DL — SIGNIFICANT CHANGE UP (ref 32–36)
MCV RBC AUTO: 85.4 FL — SIGNIFICANT CHANGE UP (ref 80–100)
MONOCYTES NFR BLD AUTO: 8 % — SIGNIFICANT CHANGE UP (ref 2–14)
NEUTROPHILS NFR BLD AUTO: 71.5 % — SIGNIFICANT CHANGE UP (ref 43–77)
NITRITE UR-MCNC: NEGATIVE — SIGNIFICANT CHANGE UP
PH UR: 7 — SIGNIFICANT CHANGE UP (ref 5–8)
PLATELET # BLD AUTO: 274 K/UL — SIGNIFICANT CHANGE UP (ref 150–400)
POTASSIUM SERPL-MCNC: 4 MMOL/L — SIGNIFICANT CHANGE UP (ref 3.5–5.3)
POTASSIUM SERPL-SCNC: 4 MMOL/L — SIGNIFICANT CHANGE UP (ref 3.5–5.3)
PROT SERPL-MCNC: 6.8 G/DL — SIGNIFICANT CHANGE UP (ref 6–8.3)
PROT UR-MCNC: NEGATIVE MG/DL — SIGNIFICANT CHANGE UP
PROTHROM AB SERPL-ACNC: 11.1 SEC — SIGNIFICANT CHANGE UP (ref 9.8–12.7)
RBC # BLD: 4.25 M/UL — SIGNIFICANT CHANGE UP (ref 3.8–5.2)
RBC # FLD: 12.8 % — SIGNIFICANT CHANGE UP (ref 10.3–16.9)
SODIUM SERPL-SCNC: 140 MMOL/L — SIGNIFICANT CHANGE UP (ref 135–145)
SP GR SPEC: 1.01 — SIGNIFICANT CHANGE UP (ref 1–1.03)
URATE SERPL-MCNC: 5.9 — SIGNIFICANT CHANGE UP
UROBILINOGEN FLD QL: 0.2 E.U./DL — SIGNIFICANT CHANGE UP
WBC # BLD: 5.5 K/UL — SIGNIFICANT CHANGE UP (ref 3.8–10.5)
WBC # FLD AUTO: 5.5 K/UL — SIGNIFICANT CHANGE UP (ref 3.8–10.5)

## 2017-10-30 PROCEDURE — 99255 IP/OBS CONSLTJ NEW/EST HI 80: CPT | Mod: GC

## 2017-10-30 RX ORDER — MAGNESIUM SULFATE 500 MG/ML
4 VIAL (ML) INJECTION ONCE
Qty: 0 | Refills: 0 | Status: COMPLETED | OUTPATIENT
Start: 2017-10-30 | End: 2017-10-30

## 2017-10-30 RX ORDER — AER TRAVELER 0.5 G/1
1 SOLUTION RECTAL; TOPICAL DAILY
Qty: 0 | Refills: 0 | Status: DISCONTINUED | OUTPATIENT
Start: 2017-10-30 | End: 2017-11-01

## 2017-10-30 RX ORDER — ACETAMINOPHEN 500 MG
650 TABLET ORAL EVERY 6 HOURS
Qty: 0 | Refills: 0 | Status: DISCONTINUED | OUTPATIENT
Start: 2017-10-30 | End: 2017-11-01

## 2017-10-30 RX ORDER — SODIUM CHLORIDE 9 MG/ML
1000 INJECTION, SOLUTION INTRAVENOUS
Qty: 0 | Refills: 0 | Status: DISCONTINUED | OUTPATIENT
Start: 2017-10-30 | End: 2017-10-31

## 2017-10-30 RX ORDER — LABETALOL HCL 100 MG
300 TABLET ORAL EVERY 8 HOURS
Qty: 0 | Refills: 0 | Status: DISCONTINUED | OUTPATIENT
Start: 2017-10-30 | End: 2017-10-30

## 2017-10-30 RX ORDER — DIBUCAINE 1 %
1 OINTMENT (GRAM) RECTAL DAILY
Qty: 0 | Refills: 0 | Status: DISCONTINUED | OUTPATIENT
Start: 2017-10-30 | End: 2017-11-01

## 2017-10-30 RX ORDER — BENZOCAINE 10 %
1 GEL (GRAM) MUCOUS MEMBRANE THREE TIMES A DAY
Qty: 0 | Refills: 0 | Status: DISCONTINUED | OUTPATIENT
Start: 2017-10-30 | End: 2017-11-01

## 2017-10-30 RX ORDER — MAGNESIUM SULFATE 500 MG/ML
2 VIAL (ML) INJECTION ONCE
Qty: 40 | Refills: 0 | Status: COMPLETED | OUTPATIENT
Start: 2017-10-30 | End: 2017-10-31

## 2017-10-30 RX ORDER — LABETALOL HCL 100 MG
300 TABLET ORAL EVERY 8 HOURS
Qty: 0 | Refills: 0 | Status: DISCONTINUED | OUTPATIENT
Start: 2017-10-31 | End: 2017-11-01

## 2017-10-30 RX ADMIN — Medication 650 MILLIGRAM(S): at 20:46

## 2017-10-30 RX ADMIN — Medication 650 MILLIGRAM(S): at 21:45

## 2017-10-30 RX ADMIN — AER TRAVELER 1 APPLICATION(S): 0.5 SOLUTION RECTAL; TOPICAL at 20:46

## 2017-10-30 RX ADMIN — Medication 300 MILLIGRAM(S): at 16:57

## 2017-10-30 RX ADMIN — Medication 300 GRAM(S): at 16:57

## 2017-10-30 NOTE — PROGRESS NOTE ADULT - SUBJECTIVE AND OBJECTIVE BOX
Clinical Magnesium Check    Patient continues to report frontal headache, worst on left side. Says it feels like a tension headache. Denies scotoma and epigastric pain. Endorses side effect of flushing but denies nausea, paresthesias, and shortness of breath.     Vital Signs Last 24 Hrs  T(C): --  T(F): --  HR: 86 (30 Oct 2017 21:45) (86 - 99)  BP: 132/61 (30 Oct 2017 21:45) (113/61 - 143/81)  BP(mean): 79 (30 Oct 2017 21:45) (79 - 79)  RR: 17 (30 Oct 2017 21:45) (16 - 20)  SpO2: 100% (30 Oct 2017 21:45) (98% - 100%)    Gen: resting comfortably in NAD  CV: RRR, normal S1 and S2  Resp: CTAB  Abd: soft, nontender  Neuro: 1+ reflexes bilaterally                          11.9   5.5   )-----------( 274      ( 30 Oct 2017 14:55 )             36.3     10-30    140  |  103  |  8   ----------------------------<  102<H>  4.0   |  25  |  0.75    Ca    9.2      30 Oct 2017 14:55    TPro  6.8  /  Alb  3.7  /  TBili  0.2  /  DBili  x   /  AST  65<H>  /  ALT  99<H>  /  AlkPhos  86  10-30

## 2017-10-30 NOTE — H&P ADULT - FAMILY HISTORY
Mother  Still living? Unknown  Maternal family history of hypertension, Age at diagnosis: Age Unknown

## 2017-10-30 NOTE — CONSULT NOTE ADULT - ASSESSMENT
uncontrolled HTN post partum with mild elevation of LFTs- u/a without protein and creatinine has remained stable.  Agree with treatment with Mag drip.  As discussed with OB team prior, patient was given 300 mg labetalol lid-afternoon-   BP mow at 133/74 and 139.79 on last 2 determinations ( but also on Mag drip now.  Continue this regimen of labetalol 300mg Q 8 ours unless BP drops to < 125/75- then can hold and reduce to 200 mg Q8 hours

## 2017-10-30 NOTE — H&P ADULT - HISTORY OF PRESENT ILLNESS
SHAY BOOTH is a 32y female  who delivered on 10/22/2017 via normal spontaneous vaginal delivery.  Antepartum course was uncomplicated, with the exception pelvic girdle pain, had physical therapy with significant improvement in pain. Her postpartum course was complicated by mild range blood pressures . She was start on Labetalol 200mg BID on 10/23.  Her blood pressure range from 10/22- 10/24 was [phone]. She reported mild headaches throughout her stay, relieved with Tylenol and Fioricet. Full Preeclampsia labs were sent, with significant findings including a UA with trace protein, an , Uric Acid of 5.3, and AST/ALT 78.  Her blood pressures were mild range and symptoms resolved prior to discharge.  She was sent home with a blood pressure cuff and labetalol 200mg BID.  She has been checking her blood pressures at home and first noted an increase this weekend with blood pressures around 150s/90s.  Today her blood pressure at 1133am was 181/109, she took her labetalol 200mg, her repeat BP was 171/109, and at 1156 her BP was 168/93.  Patients blood pressures in triage were mild range, ranging 133-141/75-94.  Patient reports she has had intermittent headaches for the last few days.  Reports yesterday she noted some "floaters" in her vision and over the weekend noted some double vision.  Denies nasuea / vomiting, acute swelling, heartburn, RUQ pain, chest pain, or shortness of breath. Patient called Dr. Saenz regarding elevated blood pressures and was instructed to come directly to hospital.     Patient antepartum course was uncomplicated. Patient reports that prior to pregnancy her blood pressure range was 100-110/60-70 and during her antepartum course her blood pressures were 120-130s/80-90s.  Reports a history of migraines, but reports that these headaches are different.       No medical, surgical or gyn history,.   NKDA       PAST MEDICAL & SURGICAL HISTORY:  No medical, surgical or gynecological history.       Vital Signs   (in triage):  133-142/75-94       11.9   5.5   )-----------( 274      ( 30 Oct 2017 14:55 )             36.3        PT/INR - ( 30 Oct 2017 14:55 )   PT: 11.1 sec;   INR: 1.00     PTT - ( 30 Oct 2017 14:55 )  PTT:27.6 sec,  Fibrinogen      MEDICATIONS  (STANDING):  magnesium sulfate  IVPB 4 Gram(s) IV Intermittent Once  magnesium sulfate Infusion 2 Gm/Hr IV Continuous Once    MEDICATIONS: labetalol 200mg BID    Allergies  latex (Hives)  No Known Drug Allergies      Physical Exam:  Ms SHAY BOOTH is alert and oriented X3  HEENT: Normal cephalic; atraumatic, PERRLA,Neck is supple, no masses, normal carotids,  Thyroid normal size  Lungs: clear to auscultation, no wheezes/rhonchi/rales  Heart:  RR , s1, s2, no m/r/g  Breasts: soft, non tender, full, no masses  Abdomin: soft, nondistended, nontender, BS(+)  Fundus: fundus palpated approx 2 Finger breaths below the umbilicus, firm and nontender  Extremities: no edema/erythema/tenderness, + 2  reflexes

## 2017-10-30 NOTE — PROGRESS NOTE ADULT - SUBJECTIVE AND OBJECTIVE BOX
SHAY BOOTH is a 32y female  who delivered on 10/22/2017 via normal spontaneous vaginal delivery.  Antepartum course was uncomplicated, with the exception pelvic girdle pain, had physical therapy with significant improvement in pain. Her postpartum course was complicated by mild range blood pressures . She was start on Labetalol 200mg BID      No medical, surgical or gyn history,.   NKDA   VE: 4/80/-3  TAUS: cephalic     NST: reactive, moderate variability, + acelerations, no decels   [place]: q3min, regualrly     A&P: 31 y/o @39w4d presenting in early labor   - continuous monitoring  - IVH   - pencillin for GBS   - patient may receive an epidural     POSTPARTUM  POSTPARTUM EVALUATION  Other pregnancy-related conditions, antepartum  Weeks of gestation of pregnancy not specified      PAST MEDICAL & SURGICAL HISTORY:  No medical, surgical or gynecological history.       Vital Signs   (in triage):  133-142/75-94       11.9   5.5   )-----------( 274      ( 30 Oct 2017 14:55 )             36.3        PT/INR - ( 30 Oct 2017 14:55 )   PT: 11.1 sec;   INR: 1.00     PTT - ( 30 Oct 2017 14:55 )  PTT:27.6 sec,  Fibrinogen      MEDICATIONS  (STANDING):  magnesium sulfate  IVPB 4 Gram(s) IV Intermittent Once  magnesium sulfate Infusion 2 Gm/Hr IV Continuous Once    MEDICATIONS: labetalol 200mg BID    Allergies  latex (Hives)  No Known Drug Allergies      Physical Exam:  Ms SHAY BOOTH is alert and oriented X3  HEENT: Normal cephalic; atraumatic, PERRLA,Neck is supple, no masses, normal carotids,  Thyroid normal size  Lungs: clear to auscultation, no wheezes/rhonchi/rales  Heart:  RR , s1, s2, no m/r/g  Breasts: soft, non tender, full, no masses  Abdomin: soft, nondistended, nontender, BS(+)  Fundus: fundus palpated approx 2 Finger breaths below the umbilicus, firm and nontender  Extremities: no edema/erythema/tenderness, + 2  reflexes SHAY BOOTH is a 32y female  who delivered on 10/22/2017 via normal spontaneous vaginal delivery.  Antepartum course was uncomplicated, with the exception pelvic girdle pain, had physical therapy with significant improvement in pain. Her postpartum course was complicated by mild range blood pressures . She was start on Labetalol 200mg BID on 10/23.  Her blood pressure range from 10/22- 10/24 was [phone]. She reported mild headaches throughout her stay, relieved with Tylenol and Fioricet. Full Preeclampsia labs were sent, with significant findings including a UA with trace protein, an , Uric Acid of 5.3, and AST/ALT 78.  Her blood pressures were mild range and symptoms resolved prior to discharge.  She was sent home with a blood pressure cuff and labetalol 200mg BID.  She has been checking her blood pressures at home and first noted an increase this weekend with blood pressures around 150s/90s.  Today her blood pressure at 1133am was 181/109, she took her labetalol 200mg, her repeat BP was 171/109, and at 1156 her BP was 168/93.  Patients blood pressures in triage were mild range, ranging 133-141/75-94.  Patient reports she has had intermittent headaches for the last few days.  Reports yesterday she noted some "floaters" in her vision and over the weekend noted some double vision.  Denies nasuea / vomiting, acute swelling, heartburn, RUQ pain, chest pain, or shortness of breath. Patient called Dr. Saenz regarding elevated blood pressures and was instructed to come directly to hospital.     Patient antepartum course was uncomplicated. Patient reports that prior to pregnancy her blood pressure range was 100-110/60-70 and during her antepartum course her blood pressures were 120-130s/80-90s.  Reports a history of migraines, but reports that these headaches are different.       No medical, surgical or gyn history,.   NKDA   VE: /-3  TAUS: cephalic     NST: reactive, moderate variability, + acelerations, no decels   [place]: q3min, regualrly     A&P: 33 y/o @39w4d presenting in early labor   - continuous monitoring  - IVH   - pencillin for GBS   - patient may receive an epidural     POSTPARTUM  POSTPARTUM EVALUATION  Other pregnancy-related conditions, antepartum  Weeks of gestation of pregnancy not specified      PAST MEDICAL & SURGICAL HISTORY:  No medical, surgical or gynecological history.       Vital Signs   (in triage):  133-142/75-94       11.9   5.5   )-----------( 274      ( 30 Oct 2017 14:55 )             36.3        PT/INR - ( 30 Oct 2017 14:55 )   PT: 11.1 sec;   INR: 1.00     PTT - ( 30 Oct 2017 14:55 )  PTT:27.6 sec,  Fibrinogen      MEDICATIONS  (STANDING):  magnesium sulfate  IVPB 4 Gram(s) IV Intermittent Once  magnesium sulfate Infusion 2 Gm/Hr IV Continuous Once    MEDICATIONS: labetalol 200mg BID    Allergies  latex (Hives)  No Known Drug Allergies      Physical Exam:  Ms SHAY BOOTH is alert and oriented X3  HEENT: Normal cephalic; atraumatic, PERRLA,Neck is supple, no masses, normal carotids,  Thyroid normal size  Lungs: clear to auscultation, no wheezes/rhonchi/rales  Heart:  RR , s1, s2, no m/r/g  Breasts: soft, non tender, full, no masses  Abdomin: soft, nondistended, nontender, BS(+)  Fundus: fundus palpated approx 2 Finger breaths below the umbilicus, firm and nontender  Extremities: no edema/erythema/tenderness, + 2  reflexes SHAY BOOTH is a 32y female  who delivered on 10/22/2017 via normal spontaneous vaginal delivery.  Antepartum course was uncomplicated, with the exception pelvic girdle pain, had physical therapy with significant improvement in pain. Her postpartum course was complicated by mild range blood pressures . She was start on Labetalol 200mg BID on 10/23.  Her blood pressure range from 10/22- 10/24 was [phone]. She reported mild headaches throughout her stay, relieved with Tylenol and Fioricet. Full Preeclampsia labs were sent, with significant findings including a UA with trace protein, an , Uric Acid of 5.3, and AST/ALT 78.  Her blood pressures were mild range and symptoms resolved prior to discharge.  She was sent home with a blood pressure cuff and labetalol 200mg BID.  She has been checking her blood pressures at home and first noted an increase this weekend with blood pressures around 150s/90s.  Today her blood pressure at 1133am was 181/109, she took her labetalol 200mg, her repeat BP was 171/109, and at 1156 her BP was 168/93.  Patients blood pressures in triage were mild range, ranging 133-141/75-94.  Patient reports she has had intermittent headaches for the last few days.  Reports yesterday she noted some "floaters" in her vision and over the weekend noted some double vision.  Denies nasuea / vomiting, acute swelling, heartburn, RUQ pain, chest pain, or shortness of breath. Patient called Dr. Saenz regarding elevated blood pressures and was instructed to come directly to hospital.     Patient antepartum course was uncomplicated. Patient reports that prior to pregnancy her blood pressure range was 100-110/60-70 and during her antepartum course her blood pressures were 120-130s/80-90s.  Reports a history of migraines, but reports that these headaches are different.       No medical, surgical or gyn history,.   NKDA       PAST MEDICAL & SURGICAL HISTORY:  No medical, surgical or gynecological history.       Vital Signs   (in triage):  133-142/75-94       11.9   5.5   )-----------( 274      ( 30 Oct 2017 14:55 )             36.3        PT/INR - ( 30 Oct 2017 14:55 )   PT: 11.1 sec;   INR: 1.00     PTT - ( 30 Oct 2017 14:55 )  PTT:27.6 sec,  Fibrinogen      MEDICATIONS  (STANDING):  magnesium sulfate  IVPB 4 Gram(s) IV Intermittent Once  magnesium sulfate Infusion 2 Gm/Hr IV Continuous Once    MEDICATIONS: labetalol 200mg BID    Allergies  latex (Hives)  No Known Drug Allergies      Physical Exam:  Ms SHAY BOOTH is alert and oriented X3  HEENT: Normal cephalic; atraumatic, PERRLA,Neck is supple, no masses, normal carotids,  Thyroid normal size  Lungs: clear to auscultation, no wheezes/rhonchi/rales  Heart:  RR , s1, s2, no m/r/g  Breasts: soft, non tender, full, no masses  Abdomin: soft, nondistended, nontender, BS(+)  Fundus: fundus palpated approx 2 Finger breaths below the umbilicus, firm and nontender  Extremities: no edema/erythema/tenderness, + 2  reflexes    A&P:   32y  s/p  on 10/22, postpartum course complicated by preeclampsia with severe features.  1. Preeclampsia: given mild range blood pressures in hospital and reported severe range blood pressures at home while on labetalol 200mg BID, with symptoms of Headache and "floaters", likely postpartum preeclampsia  -start on IV Magnesium @2G/hr for 24 hrs   No complaints currently.   Antihypertensives:  labetalol 300mg TID, per Dr. Villalpando rec  Continue to monitor blood pressures  Next Magnesium check @ 6hrs from start, likely around 11pm, per patient she would like to wait to start magnesium because she wants to eat and breastfeed, discussed with patient the importance of starting the magnesium as soon as possible, patient states she understands but would like to wait to she is ready, magnesium at bedside waiting for patient   Follow up on Magnesium serum level   Draw full PEC labs q 12hr    2. GI: DASH DIET ,  3. : strict Is and Os  4. Renal Consult: discussed patient with Dr. Villalpando, she will come evaluate patient this evening, follow up on final recs, currently rec 300mg TID, to give first dose now

## 2017-10-30 NOTE — CONSULT NOTE ADULT - SUBJECTIVE AND OBJECTIVE BOX
Patient is a 32y woman post-partum day 8, , admitted for uncontrolled HTN  Did have rising BP intrapartum and post-partum- stabilized and discharged home on labetalol 200 mg BID.  Found her BP's rising at home to >150/90 with dull headache. No N, V.  Did have an episode of blurry vision.  No h/o chronic HTN    PAST MEDICAL & SURGICAL HISTORY:  No pertinent past medical history  No significant past surgical history      MEDICATIONS  (STANDING):  dibucaine 1% Ointment 1 Application(s) Topical daily  labetalol 300 milliGRAM(s) Oral every 8 hours  lactated ringers. 1000 milliLiter(s) (75 mL/Hr) IV Continuous <Continuous>  magnesium sulfate Infusion 2 Gm/Hr IV Continuous Once  witch hazel Pads 1 Application(s) Topical daily    MEDICATIONS  (PRN):  acetaminophen   Tablet. 650 milliGRAM(s) Oral every 6 hours PRN Mild Pain (1 - 3)      Allergies    latex (Hives)  No Known Drug Allergies            SOCIAL HISTORY: No smoke    FAMILY HISTORY:  Maternal family history of hypertension (Mother): in pregnancy    ROS: negative for cardio, pulm, heme, GI, derm, allergy, general     T(C): --  T(F): --  HR: 92 (10-30-17 @ 14:30)  BP: 142/94 (10-30-17 @ 14:30)  BP(mean): --  RR: 18 (10-30-17 @ 14:30)  SpO2: 100% (10-30-17 @ 14:30)  Wt(kg): --    Height (cm): 170.18 (10-30 @ 17:00)    PHYSICAL EXAM:  Constitutional: Well appearing.  No acute distress  Respiratory: Clear to auscultation   Cardiovascular: S1, S2.  Regular rate and rhythm.    Gastrointestinal: soft,  Extremities: Warm.  No lower extremity edema.    Neurological: No focal deficits.  Skin: Warm. Dry.    Psychiatric: Normal affect.        LABS:                        11.9   5.5   )-----------( 274      ( 30 Oct 2017 14:55 )             36.3     10-30    140  |  103  |  8   ----------------------------<  102<H>  4.0   |  25  |  0.75    Ca    9.2      30 Oct 2017 14:55    TPro  6.8  /  Alb  3.7  /  TBili  0.2  /  DBili  x   /  AST  65<H>  /  ALT  99<H>  /  AlkPhos  86  10-30    Uric Acid, Serum: 5.9 (10-30 @ 14:55)    PT/INR - ( 30 Oct 2017 14:55 )   PT: 11.1 sec;   INR: 1.00          PTT - ( 30 Oct 2017 14:55 )  PTT:27.6 sec  Urinalysis Basic - ( 30 Oct 2017 17:32 )    Color: Yellow / Appearance: Clear / S.015 / pH: x  Gluc: x / Ketone: NEGATIVE  / Bili: Negative / Urobili: 0.2 E.U./dL   Blood: x / Protein: NEGATIVE mg/dL / Nitrite: NEGATIVE   Leuk Esterase: Small / RBC: Many /HPF / WBC > 10 /HPF   Sq Epi: x / Non Sq Epi: 0-5 /HPF / Bacteria: Present /HPF

## 2017-10-30 NOTE — H&P ADULT - ASSESSMENT
A&P:   32y  s/p  on 10/22, postpartum course complicated by preeclampsia with severe features.  1. Preeclampsia: given mild range blood pressures in hospital and reported severe range blood pressures at home while on labetalol 200mg BID, with symptoms of Headache and "floaters", likely postpartum preeclampsia  -start on IV Magnesium @2G/hr for 24 hrs   No complaints currently.   Antihypertensives:  labetalol 300mg TID, per Dr. Villalpando rec  Continue to monitor blood pressures  Next Magnesium check @ 6hrs from start, likely around 11pm, per patient she would like to wait to start magnesium because she wants to eat and breastfeed, discussed with patient the importance of starting the magnesium as soon as possible, patient states she understands but would like to wait to she is ready, magnesium at bedside waiting for patient   Follow up on Magnesium serum level   Draw full PEC labs q 12hr    2. GI: DASH DIET ,  3. : strict Is and Os  4. Renal Consult: discussed patient with Dr. Villalpando, she will come evaluate patient this evening, follow up on final recs, currently rec 300mg TID, to give first dose now

## 2017-10-31 LAB
ALBUMIN SERPL ELPH-MCNC: 3.7 G/DL — SIGNIFICANT CHANGE UP (ref 3.3–5)
ALP SERPL-CCNC: 85 U/L — SIGNIFICANT CHANGE UP (ref 40–120)
ALT FLD-CCNC: 87 U/L — HIGH (ref 10–45)
ANION GAP SERPL CALC-SCNC: 12 MMOL/L — SIGNIFICANT CHANGE UP (ref 5–17)
AST SERPL-CCNC: 50 U/L — HIGH (ref 10–40)
BILIRUB SERPL-MCNC: <0.2 MG/DL — SIGNIFICANT CHANGE UP (ref 0.2–1.2)
BUN SERPL-MCNC: 6 MG/DL — LOW (ref 7–23)
CALCIUM SERPL-MCNC: 7.6 MG/DL — LOW (ref 8.4–10.5)
CHLORIDE SERPL-SCNC: 98 MMOL/L — SIGNIFICANT CHANGE UP (ref 96–108)
CO2 SERPL-SCNC: 26 MMOL/L — SIGNIFICANT CHANGE UP (ref 22–31)
CREAT SERPL-MCNC: 0.72 MG/DL — SIGNIFICANT CHANGE UP (ref 0.5–1.3)
GLUCOSE SERPL-MCNC: 104 MG/DL — HIGH (ref 70–99)
HCT VFR BLD CALC: 35.5 % — SIGNIFICANT CHANGE UP (ref 34.5–45)
HGB BLD-MCNC: 11.6 G/DL — SIGNIFICANT CHANGE UP (ref 11.5–15.5)
LDH SERPL L TO P-CCNC: 404 U/L — HIGH (ref 50–242)
MAGNESIUM SERPL-MCNC: 5.4 MG/DL — HIGH (ref 1.6–2.6)
MAGNESIUM SERPL-MCNC: 5.6 MG/DL — HIGH (ref 1.6–2.6)
MCHC RBC-ENTMCNC: 27.9 PG — SIGNIFICANT CHANGE UP (ref 27–34)
MCHC RBC-ENTMCNC: 32.7 G/DL — SIGNIFICANT CHANGE UP (ref 32–36)
MCV RBC AUTO: 85.3 FL — SIGNIFICANT CHANGE UP (ref 80–100)
PLATELET # BLD AUTO: 277 K/UL — SIGNIFICANT CHANGE UP (ref 150–400)
POTASSIUM SERPL-MCNC: 3.9 MMOL/L — SIGNIFICANT CHANGE UP (ref 3.5–5.3)
POTASSIUM SERPL-SCNC: 3.9 MMOL/L — SIGNIFICANT CHANGE UP (ref 3.5–5.3)
PROT SERPL-MCNC: 6.6 G/DL — SIGNIFICANT CHANGE UP (ref 6–8.3)
RBC # BLD: 4.16 M/UL — SIGNIFICANT CHANGE UP (ref 3.8–5.2)
RBC # FLD: 12.8 % — SIGNIFICANT CHANGE UP (ref 10.3–16.9)
SODIUM SERPL-SCNC: 136 MMOL/L — SIGNIFICANT CHANGE UP (ref 135–145)
URATE SERPL-MCNC: 5.3 — SIGNIFICANT CHANGE UP
WBC # BLD: 6.6 K/UL — SIGNIFICANT CHANGE UP (ref 3.8–10.5)
WBC # FLD AUTO: 6.6 K/UL — SIGNIFICANT CHANGE UP (ref 3.8–10.5)

## 2017-10-31 PROCEDURE — 99232 SBSQ HOSP IP/OBS MODERATE 35: CPT | Mod: GC

## 2017-10-31 RX ADMIN — Medication 1 TABLET(S): at 15:05

## 2017-10-31 RX ADMIN — Medication 650 MILLIGRAM(S): at 22:19

## 2017-10-31 RX ADMIN — Medication 50 GM/HR: at 14:13

## 2017-10-31 RX ADMIN — Medication 1 APPLICATION(S): at 11:31

## 2017-10-31 RX ADMIN — Medication 300 MILLIGRAM(S): at 01:18

## 2017-10-31 RX ADMIN — Medication 650 MILLIGRAM(S): at 03:05

## 2017-10-31 RX ADMIN — Medication 300 MILLIGRAM(S): at 09:13

## 2017-10-31 RX ADMIN — Medication 650 MILLIGRAM(S): at 10:15

## 2017-10-31 RX ADMIN — Medication 1 TABLET(S): at 14:10

## 2017-10-31 RX ADMIN — Medication 650 MILLIGRAM(S): at 03:50

## 2017-10-31 RX ADMIN — Medication 650 MILLIGRAM(S): at 21:19

## 2017-10-31 RX ADMIN — SODIUM CHLORIDE 75 MILLILITER(S): 9 INJECTION, SOLUTION INTRAVENOUS at 03:07

## 2017-10-31 RX ADMIN — Medication 300 MILLIGRAM(S): at 16:58

## 2017-10-31 RX ADMIN — AER TRAVELER 1 APPLICATION(S): 0.5 SOLUTION RECTAL; TOPICAL at 11:31

## 2017-10-31 RX ADMIN — Medication 650 MILLIGRAM(S): at 09:13

## 2017-10-31 NOTE — PROGRESS NOTE ADULT - SUBJECTIVE AND OBJECTIVE BOX
Patient evaluated at bedside for clinical magnesium check at 1030am. Serum magnesium to be drawn at 11am.  She denies visual disturbances including scotoma,right upper quadrant pain, nausea/vomiting/epigastric pain/shortness of breath. Reports she has had a headache throughout the night, not relieved with tylenol. She believes the headache is due to being woken up throughout the night and not getting rest. States that the headache is mild and does not want more medication for the headache at this time.  Reports that she had some flushing and warmth when magnesium started but that has since stopped. Urinating frequently and no difficulty ambulating.      T(C): 36.6 (10-31-17 @ 10:00), Max: 36.6 (10-31-17 @ 10:00)  HR: 88 (10-31-17 @ 10:00) (80 - 90)  BP: 120/67 (10-31-17 @ 10:00) (118/67 - 126/69)  RR: 18 (10-31-17 @ 10:00) (18 - 20)  SpO2: 99% (10-31-17 @ 10:00) (97% - 100%)  Wt(kg): --  Daily Height in cm: 170.18 (30 Oct 2017 17:00)    Daily Weight Pre-pregnancy in k (30 Oct 2017 17:00)    10-30 @ 07:  -  10-31 @ 07:00  --------------------------------------------------------  IN: 1650 mL / OUT: 2600 mL / NET: -950 mL    10-31 @ 07:  -  10-31 @ 10:14  --------------------------------------------------------  IN: 0 mL / OUT: 800 mL / NET: -800 mL      Gen: NAD, AAOx3  CV: RRR, no M/R/G  Pulm: CTAB, no R/R/W  Abd: soft, nontender, no rebound or guarding, no epigastric tenderness, liver nonpalpable +BS, fundus palpated   : normal lochia  Ext: +1 edema mary, SCDs in place, Reflexes 2+                          11.6   6.6   )-----------( 277      ( 31 Oct 2017 05:42 )             35.5     10-31    136  |  98  |  6<L>  ----------------------------<  104<H>  3.9   |  26  |  0.72    Ca    7.6<L>      31 Oct 2017 05:42  Mg     5.4     10-31    TPro  6.6  /  Alb  3.7  /  TBili  <0.2  /  DBili  x   /  AST  50<H>  /  ALT  87<H>  /  AlkPhos  85  10-31    acetaminophen   Tablet. 650 milliGRAM(s) Oral every 6 hours PRN  benzocaine 20%/menthol 0.5% Spray 1 Spray(s) Topical three times a day PRN  dibucaine 1% Ointment 1 Application(s) Topical daily  labetalol 300 milliGRAM(s) Oral every 8 hours  lactated ringers. 1000 milliLiter(s) IV Continuous <Continuous>  magnesium sulfate Infusion 2 Gm/Hr IV Continuous Once  witch hazel Pads 1 Application(s) Topical daily      10-30-17 @ 07:  -  10-31-17 @ 07:00  --------------------------------------------------------  IN: 1650 mL / OUT: 2600 mL / NET: -950 mL    10-31-17 @ 07:  -  10-31-17 @ 10:14  --------------------------------------------------------  IN: 0 mL / OUT: 800 mL / NET: -800 mL

## 2017-10-31 NOTE — LACTATION INITIAL EVALUATION - NS LACT CON REASON FOR REQ
hospital readmission/9days postpartum, readmit for PEC/magnesium sulfate. mom reports baby refuses to latch since milk came in last week. she has been expressing breast milk and bottle feeding. mom reports pumping approx 4x/day totalling approx 20oz/day. advised to increase pump frequency to q2-3h to avoid engorgement and drop in milk production. initiated s2s and assisted with latch and positioning. baby sleepy, but willing to attempt latch. baby latched several times on both breasts but tires quickly. latch observed to be deep and effective, audible swallowing but baby stops after 3-4 suck cycles. reviewed bfing guidelines/positioning techniques. reassurance and emotional support provided. advised mom to continue to pump/supplement with EBM until baby maintains latch consisitently. advised s2s and breast first when baby is sleepy, just waking up, not that hungry. advised slow flow nipple and paced bottle feeding./primaparous mom

## 2017-10-31 NOTE — LACTATION INITIAL EVALUATION - INFANT FEEDING PLAN COMMENT, OB PROFILE
offer breast each feed while baby is s2s and calm. if no latch, pump 15-20min q2-3h and pace bottle with slow flow nipple.

## 2017-10-31 NOTE — PROGRESS NOTE ADULT - SUBJECTIVE AND OBJECTIVE BOX
Patient evaluated at bedside for clinical magnesium check at 1630. Serum magnesium level has been therapeutic and magnesium to be stopped at 1700.  She denies visual disturbances including scotoma, right upper quadrant pain, nausea/vomiting/epigastric pain/shortness of breath. Reports that she has had an intermittent headache all day, recently took Fioricet with some mild relief, but continues to have some sensitivity to light and continues to feel very tired. Pain well controlled.  Passing flatus.  Urinating frequently without difficulty.  Ambulating without difficulty/lightheadness      T(C): 36.7 (10-31-17 @ 14:17), Max: 36.7 (10-31-17 @ 14:17)  HR: 82 (10-31-17 @ 14:17) (80 - 88)  BP: 126/81 (10-31-17 @ 14:17) (119/71 - 126/81)  RR: 20 (10-31-17 @ 14:17) (18 - 20)  SpO2: 98% (10-31-17 @ 14:17) (98% - 100%)  Wt(kg): --  Daily Height in cm: 170.18 (30 Oct 2017 17:00)    Daily Weight Pre-pregnancy in k (30 Oct 2017 17:00)    10-30 @ 07:  -  10-31 @ 07:00  --------------------------------------------------------  IN: 1650 mL / OUT: 2600 mL / NET: -950 mL    10-31 @ 07:  -  10-31 @ 16:29  --------------------------------------------------------  IN: 0 mL / OUT: 800 mL / NET: -800 mL      Gen: NAD, AAOx3  CV: RRR, no M/R/G  Pulm: CTAB, no R/R/W  Abd: soft, nontender, no rebound or guarding, no epigastric tenderness, liver nonpalpable +BS, fundus palpated   Ext: no edema mary, SCDs in place, Reflexes 2+                          11.6   6.6   )-----------( 277      ( 31 Oct 2017 05:42 )             35.5     10-31    136  |  98  |  6<L>  ----------------------------<  104<H>  3.9   |  26  |  0.72    Ca    7.6<L>      31 Oct 2017 05:42  Mg     5.6     10-31    TPro  6.6  /  Alb  3.7  /  TBili  <0.2  /  DBili  x   /  AST  50<H>  /  ALT  87<H>  /  AlkPhos  85  10-31    acetaminophen   Tablet. 650 milliGRAM(s) Oral every 6 hours PRN  benzocaine 20%/menthol 0.5% Spray 1 Spray(s) Topical three times a day PRN  dibucaine 1% Ointment 1 Application(s) Topical daily  labetalol 300 milliGRAM(s) Oral every 8 hours  lactated ringers. 1000 milliLiter(s) IV Continuous <Continuous>  witch hazel Pads 1 Application(s) Topical daily      10-30-17 @ :  -  10-31-17 @ 07:00  --------------------------------------------------------  IN: 1650 mL / OUT: 2600 mL / NET: -950 mL    10-31-17 @ 07:  -  10-31-17 @ 16:29  --------------------------------------------------------  IN: 0 mL / OUT: 800 mL / NET: -800 mL

## 2017-10-31 NOTE — LACTATION INITIAL EVALUATION - LACTATION INTERVENTIONS
keep baby calm at breast, attempt bfing when baby is not hungry. use slow flow nipples and pace bottle. pump 8-12x/day for 15-20min to maintain production/initiate skin to skin

## 2017-10-31 NOTE — PROGRESS NOTE ADULT - SUBJECTIVE AND OBJECTIVE BOX
Patient seen and examined at bedside.   comfortable but tired.  No headache, visual changes N, V  on Mag drip and labetalol 300 mg  Q 8 hours     T(C): , Max: 36.6 (10-31-17 @ 10:00)  T(F): , Max: 97.9 (10-31-17 @ 10:00)  HR: 88 (10-31-17 @ 10:00)  BP: 120/67 (10-31-17 @ 10:00)  BP(mean): 79 (10-30-17 @ 21:45)  RR: 18 (10-31-17 @ 10:00)  SpO2: 99% (10-31-17 @ 10:00)  Wt(kg): --    10-30 @ 07:01  -  10-31 @ 07:00  --------------------------------------------------------  IN:    lactated ringers.: 1050 mL    magnesium sulfate  Infusion: 600 mL  Total IN: 1650 mL    OUT:    Voided: 2600 mL  Total OUT: 2600 mL    Total NET: -950 mL      10-31 @ 07:01  -  10-31 @ 10:21  --------------------------------------------------------  IN:  Total IN: 0 mL    OUT:    Voided: 800 mL  Total OUT: 800 mL    Total NET: -800 mL        Height (cm): 170.18 (10-30 @ 17:00)  Weight (kg): 86.4 (10-30 @ 17:00)  BMI (kg/m2): 29.8 (10-30 @ 17:00)  BSA (m2): 1.98 (10-30 @ 17:00)    dibucaine 1% Ointment 1 daily  labetalol 300 every 8 hours  lactated ringers. 1000 <Continuous>  magnesium sulfate Infusion 2 Once  witch hazel Pads 1 daily    Allergies    latex (Hives)  No Known Drug Allergies    Intolerances      PHYSICAL EXAM:  Constitutional:  No acute distress  Respiratory: Clear to auscultation   Cardiovascular: S1, S2.  Regular rate and rhythm.    Gastrointestinal: soft, non distended  Vasc/Extremities:  No lower extremity edema.    Neurological: No focal deficits.  Skin: Warm. Dry.      Psychiatric: Normal affect.        LABS:                        11.6   6.6   )-----------( 277      ( 31 Oct 2017 05:42 )             35.5     10-31    136  |  98  |  6<L>  ----------------------------<  104<H>  3.9   |  26  |  0.72    Ca    7.6<L>      31 Oct 2017 05:42  Mg     5.4     10-31    TPro  6.6  /  Alb  3.7  /  TBili  <0.2  /  DBili  x   /  AST  50<H>  /  ALT  87<H>  /  AlkPhos  85  10-31    Uric Acid, Serum: 5.3 (10-31 @ 05:42)  Uric Acid, Serum: 5.9 (10-30 @ 14:55)    PT/INR - ( 30 Oct 2017 14:55 )   PT: 11.1 sec;   INR: 1.00          PTT - ( 30 Oct 2017 14:55 )  PTT:27.6 sec  Urinalysis Basic - ( 30 Oct 2017 17:32 )    Color: Yellow / Appearance: Clear / S.015 / pH: x  Gluc: x / Ketone: NEGATIVE  / Bili: Negative / Urobili: 0.2 E.U./dL   Blood: x / Protein: NEGATIVE mg/dL / Nitrite: NEGATIVE   Leuk Esterase: Small / RBC: Many /HPF / WBC > 10 /HPF   Sq Epi: x / Non Sq Epi: 0-5 /HPF / Bacteria: Present /HPF

## 2017-11-01 ENCOUNTER — TRANSCRIPTION ENCOUNTER (OUTPATIENT)
Age: 32
End: 2017-11-01

## 2017-11-01 VITALS — SYSTOLIC BLOOD PRESSURE: 125 MMHG | DIASTOLIC BLOOD PRESSURE: 69 MMHG

## 2017-11-01 DIAGNOSIS — I10 ESSENTIAL (PRIMARY) HYPERTENSION: ICD-10-CM

## 2017-11-01 LAB
ALBUMIN SERPL ELPH-MCNC: 3.7 G/DL — SIGNIFICANT CHANGE UP (ref 3.3–5)
ALP SERPL-CCNC: 80 U/L — SIGNIFICANT CHANGE UP (ref 40–120)
ALT FLD-CCNC: 70 U/L — HIGH (ref 10–45)
ANION GAP SERPL CALC-SCNC: 11 MMOL/L — SIGNIFICANT CHANGE UP (ref 5–17)
AST SERPL-CCNC: 33 U/L — SIGNIFICANT CHANGE UP (ref 10–40)
BASOPHILS NFR BLD AUTO: 0.4 % — SIGNIFICANT CHANGE UP (ref 0–2)
BILIRUB SERPL-MCNC: <0.2 MG/DL — SIGNIFICANT CHANGE UP (ref 0.2–1.2)
BUN SERPL-MCNC: 8 MG/DL — SIGNIFICANT CHANGE UP (ref 7–23)
CALCIUM SERPL-MCNC: 7.9 MG/DL — LOW (ref 8.4–10.5)
CHLORIDE SERPL-SCNC: 104 MMOL/L — SIGNIFICANT CHANGE UP (ref 96–108)
CO2 SERPL-SCNC: 25 MMOL/L — SIGNIFICANT CHANGE UP (ref 22–31)
CREAT SERPL-MCNC: 0.81 MG/DL — SIGNIFICANT CHANGE UP (ref 0.5–1.3)
EOSINOPHIL NFR BLD AUTO: 2.7 % — SIGNIFICANT CHANGE UP (ref 0–6)
GLUCOSE SERPL-MCNC: 85 MG/DL — SIGNIFICANT CHANGE UP (ref 70–99)
HCT VFR BLD CALC: 36.2 % — SIGNIFICANT CHANGE UP (ref 34.5–45)
HGB BLD-MCNC: 11.6 G/DL — SIGNIFICANT CHANGE UP (ref 11.5–15.5)
LDH SERPL L TO P-CCNC: 394 U/L — HIGH (ref 50–242)
LYMPHOCYTES # BLD AUTO: 27.1 % — SIGNIFICANT CHANGE UP (ref 13–44)
MCHC RBC-ENTMCNC: 27.8 PG — SIGNIFICANT CHANGE UP (ref 27–34)
MCHC RBC-ENTMCNC: 32 G/DL — SIGNIFICANT CHANGE UP (ref 32–36)
MCV RBC AUTO: 86.8 FL — SIGNIFICANT CHANGE UP (ref 80–100)
MONOCYTES NFR BLD AUTO: 8.6 % — SIGNIFICANT CHANGE UP (ref 2–14)
NEUTROPHILS NFR BLD AUTO: 61.2 % — SIGNIFICANT CHANGE UP (ref 43–77)
PLATELET # BLD AUTO: 280 K/UL — SIGNIFICANT CHANGE UP (ref 150–400)
POTASSIUM SERPL-MCNC: 4.3 MMOL/L — SIGNIFICANT CHANGE UP (ref 3.5–5.3)
POTASSIUM SERPL-SCNC: 4.3 MMOL/L — SIGNIFICANT CHANGE UP (ref 3.5–5.3)
PROT SERPL-MCNC: 6.7 G/DL — SIGNIFICANT CHANGE UP (ref 6–8.3)
RBC # BLD: 4.17 M/UL — SIGNIFICANT CHANGE UP (ref 3.8–5.2)
RBC # FLD: 13 % — SIGNIFICANT CHANGE UP (ref 10.3–16.9)
SODIUM SERPL-SCNC: 140 MMOL/L — SIGNIFICANT CHANGE UP (ref 135–145)
URATE SERPL-MCNC: 4.8 — SIGNIFICANT CHANGE UP
WBC # BLD: 5.6 K/UL — SIGNIFICANT CHANGE UP (ref 3.8–10.5)
WBC # FLD AUTO: 5.6 K/UL — SIGNIFICANT CHANGE UP (ref 3.8–10.5)

## 2017-11-01 PROCEDURE — 99214 OFFICE O/P EST MOD 30 MIN: CPT

## 2017-11-01 PROCEDURE — 85025 COMPLETE CBC W/AUTO DIFF WBC: CPT

## 2017-11-01 PROCEDURE — 85730 THROMBOPLASTIN TIME PARTIAL: CPT

## 2017-11-01 PROCEDURE — 83735 ASSAY OF MAGNESIUM: CPT

## 2017-11-01 PROCEDURE — 85384 FIBRINOGEN ACTIVITY: CPT

## 2017-11-01 PROCEDURE — 85610 PROTHROMBIN TIME: CPT

## 2017-11-01 PROCEDURE — 80053 COMPREHEN METABOLIC PANEL: CPT

## 2017-11-01 PROCEDURE — 84550 ASSAY OF BLOOD/URIC ACID: CPT

## 2017-11-01 PROCEDURE — 83615 LACTATE (LD) (LDH) ENZYME: CPT

## 2017-11-01 PROCEDURE — 85027 COMPLETE CBC AUTOMATED: CPT

## 2017-11-01 PROCEDURE — 36415 COLL VENOUS BLD VENIPUNCTURE: CPT

## 2017-11-01 PROCEDURE — 81001 URINALYSIS AUTO W/SCOPE: CPT

## 2017-11-01 RX ORDER — LABETALOL HCL 100 MG
1 TABLET ORAL
Qty: 0 | Refills: 0 | COMMUNITY
Start: 2017-11-01

## 2017-11-01 RX ADMIN — Medication 300 MILLIGRAM(S): at 01:11

## 2017-11-01 RX ADMIN — Medication 650 MILLIGRAM(S): at 09:07

## 2017-11-01 RX ADMIN — Medication 650 MILLIGRAM(S): at 09:51

## 2017-11-01 RX ADMIN — Medication 300 MILLIGRAM(S): at 09:07

## 2017-11-01 NOTE — PROGRESS NOTE ADULT - SUBJECTIVE AND OBJECTIVE BOX
Patient evaluated at bedside during morning rounds, however patient was in the shower.  Will return later this morning to evaluate.

## 2017-11-01 NOTE — PROGRESS NOTE ADULT - SUBJECTIVE AND OBJECTIVE BOX
Patient evaluated at bedside.   She reports pain is well tolerated.  Only has cramps when pumping.  She reports some mild low back anc hip discomfort as well.  She denies headache, dizziness, chest pain, palpitations, shortness of breath, nausea, vomiting, heavy vaginal bleeding or perineal discomfort.  She has been ambulating without assistance, voiding spontaneously, and is breastfeeding.  Tolerating regular diet    Physical Exam:  VSS: 117-120's/ 80's    GA: NAD, A+0 x 3  CV: RRR  Pulm: CTAB  Breasts: soft, engorge BL, no palpable masses  Abd: + BS, soft, nontender, nondistended, no rebound or guarding, uterus firm at midline, 4  fb below umbilicus  : lochia WNL  Extremities: very mild BL pedal swelling.  No calf tenderness, reflexes +2 bilaterally                       11.6   5.6   )-----------( 280      ( 2017 06:38 )             36.2     10-31    136  |  98  |  6<L>  ----------------------------<  104<H>  3.9   |  26  |  0.72    Ca    7.6<L>      31 Oct 2017 05:42  Mg     5.6     10-31    TPro  6.6  /  Alb  3.7  /  TBili  <0.2  /  DBili  x   /  AST  50<H>  /  ALT  87<H>  /  AlkPhos  85  10-31    Assessment:   PPD #  10, HD #2 S/P  with PP PEC  Plan:  S/P magnesium sulfate prophylaxis, now on labetalol 300 TID with good control.  Will continue Labetalol as prescribed.  Supportive Care  Diet as tolerated  Oral pain Meds PRN  Will follow AM labs.  If stable and trending down. may discharge home to f/u with Ob/Gyn and Renal as an outpatient.

## 2017-11-01 NOTE — DISCHARGE NOTE OB - CARE PLAN
Principal Discharge DX:	Preeclampsia  Goal:	f/u as outpatient w/ Dr. Saenz  Instructions for follow-up, activity and diet:	see instructions below, precautions reviewed, f/u as outpatient w/ Dr. Saenz and nephrology

## 2017-11-01 NOTE — DISCHARGE NOTE OB - PATIENT PORTAL LINK FT
“You can access the [facility] Patient Portal, offered by [facility], by registering with the following website: [email or web address]”

## 2017-11-01 NOTE — DISCHARGE NOTE OB - PLAN OF CARE
f/u as outpatient w/ Dr. Saenz see instructions below, precautions reviewed, f/u as outpatient w/ Dr. Saenz and nephrology

## 2017-11-01 NOTE — DISCHARGE NOTE OB - CARE PROVIDER_API CALL
Sydnie Saenz (MD), Obstetrics and Gynecology  [address] [place] [address]  Phone: [phone]  Fax: [phone]

## 2017-11-01 NOTE — DISCHARGE NOTE OB - MEDICATION SUMMARY - MEDICATIONS TO TAKE
I will START or STAY ON the medications listed below when I get home from the hospital:    labetalol 300 mg oral tablet  -- 1 tab(s) by mouth every 8 hours  -- Indication: For hypertension

## 2017-11-01 NOTE — PROGRESS NOTE ADULT - SUBJECTIVE AND OBJECTIVE BOX
CC: POSTPARTUM  POSTPARTUM EVALUATION      INTERVAL HISTORY: feels well  ready for discharge      ROS: No chest pain, no sob, no abd pain. No n/v/d    PAST MEDICAL & SURGICAL HISTORY:  No pertinent past medical history  No significant past surgical history      PHYSICAL EXAM:  T(C): 36.7 (17 @ 09:05), Max: 37 (17 @ 01:06)  HR: 85 (17 @ 09:05)  BP: 133/79 (17 @ 09:05) (119/71 - 133/79)  RR: 17 (17 @ 09:05)  SpO2: 99% (17 @ 09:05)  Wt(kg): --  I&O's Summary    31 Oct 2017 07:01  -  2017 07:00  --------------------------------------------------------  IN: 0 mL / OUT: 1850 mL / NET: -1850 mL      Weight 86.4 (10-30 @ 17:00)  General: AAO x 3,  NAD.  HEENT: moist mucous membranes, no pallor/cyanosis.  Neck: no JVD visible.  Cardiac: S1, S2. RRR. No murmurs   Respratory: CTA b/l, no access muscle use.   Abdomen: soft. nontender. nondistended  Skin: no rashes.  Extremities: no LE edema b/l  Access:       DATA:                        11.6   5.6   )-----------( 280      ( 2017 06:38 )             36.2         140    |  104    |  8      ----------------------------<  85     Ca:7.9<L> (2017 06:38)  4.3     |  25     |  0.81       eGFR if Non : 95  eGFR if : 111    TPro  6.7 g/dL  /  Alb  3.7 g/dL  /  TBili  <0.2 mg/dL  /  DBili  x      /  AST  33 U/L  /  ALT  70 U/L<H>  /  AlkPhos  80 U/L  2017 06:38  Lactate Dehydrogenase, Serum: 394 U/L <H> ( @ 06:38)  Lactate Dehydrogenase, Serum: 404 U/L <H> (10-31 @ 05:42)  Lactate Dehydrogenase, Serum: 418 U/L <H> (10-30 @ 14:55)        Urinalysis Basic - ( 30 Oct 2017 17:32 )  Color: Yellow / Appearance: Clear / S.015 / pH: x  Gluc: x / Ketone: NEGATIVE  / Bili: Negative / Urobili: 0.2 E.U./dL   Blood: x / Protein: NEGATIVE mg/dL / Nitrite: NEGATIVE   Leuk Esterase: Small<!!> / RBC: Many /HPF<!!> / WBC > 10 /HPF<!!>   Sq Epi: x / Non Sq Epi: 0-5 /HPF / Bacteria: Present /HPF<!!>                MEDICATIONS  (STANDING):  dibucaine 1% Ointment 1 Application(s) Topical daily  labetalol 300 milliGRAM(s) Oral every 8 hours  witch hazel Pads 1 Application(s) Topical daily    MEDICATIONS  (PRN):  acetaminophen   Tablet. 650 milliGRAM(s) Oral every 6 hours PRN Mild Pain (1 - 3)  benzocaine 20%/menthol 0.5% Spray 1 Spray(s) Topical three times a day PRN severe perineal pain

## 2017-11-01 NOTE — DISCHARGE NOTE OB - HOSPITAL COURSE
pt re-admitted for postpartum preeclampsia w/ severe features, mild range HTN w/ headache, elevated LFTs, she is now s/p IV magnesium, BP normotensive, s/p consult w/ nephrology recommending labetalol 300mg TID for control, LFTs downtrended, pt stable for d/c w/ strict precautions, home BP monitoring and f/u with Dr. Saenz and nephrology in 1 week

## 2017-11-01 NOTE — PROGRESS NOTE ADULT - ASSESSMENT
33 yo BF post-partum with HTN
A&P:   32y  s/p  on 10/22, postpartum complicated by preeclampsia with severe features    1. Preeclampsia: Continue IV Magnesium @2G/hr for 24 hrs from start. (till 10/31 @5pm)  Continue with Tylenol for headache, offered Fioricet patient declined because headache mild.   Antihypertensives: labetalol 300mg TID   Continue to monitor blood pressures, blood pressures remain normotensive to mild range   Next Magnesium check @ 5pm  Follow up on Magnesium serum level drawn at 11am    2. GI: tolerating regular diet, passing flatus, no nausea/vomiting    3. : strict Is and Os, urinating frequently on own, urine output has been adequate   4. Nephrology Consult: evaluated by Dr. Villalpando this morning, plan to continue with labetalol regimen, will adjust up or down as needed based on BP
A&P:   32y  s/p  on 10/22, returned to hospital on 10/30,  complicated by preeclampsia with severe features postpartum.  1. Preeclampsia: stop IV Magnesium at 1700 today.  Continues to have a headache, Fioricet  helped relieve some symptoms but patient thinks headache is secondary to getting very little sleep, continue to closely monitor symptoms, denies all other toxic complaints  Antihypertensives: Labetalol 300mg TID per Dr. Kwasi an(nephrology consult) with goal BP of 120s-130s/80, UA and Cr stable, titrate labetalol as needed after Mg is stopped  Continue to monitor blood pressures, currently are normotensive  Full preeclampsia labs to be drawn in AM tomorrow to ensure LFTs are downtrending and to ensure that electrolytes are WNL after the magnesium is stopped    2. GI: tolerating regular diet, passing flatus, no nasuea/vomiting    3. : strict Is and Os till IV Magnesium is stopped  4. Dispo: plan for discharge tomorrow after evaluated by nephrology and after follow up labs are back and are WNL, to follow up in 1 week with Dr. Villalpando outpatient and with Dr. Saenz , patient has blood pressure cuff and understands that she should take her BP 3x daily
A/P: 31yo  s/p  PPD#8 readmitted with PEC with severe features.  -continue IV magnesium until 5pm 10/31; currently with headache but manageable side effects  -serum mag draw now  -some mild range blood pressures on labetalol 300 TID; will continue to monitor  -next mag check in 6 hours
A&P:   32y  s/p  on 10/22, postpartum course complicated by preeclampsia with severe features.  1. Preeclampsia: given mild range blood pressures in hospital and reported severe range blood pressures at home while on labetalol 200mg BID, with symptoms of Headache and "floaters", likely postpartum preeclampsia  -start on IV Magnesium @2G/hr for 24 hrs   No complaints currently.   Antihypertensives:  labetalol 300mg TID, per Dr. Villalpando rec  Continue to monitor blood pressures  Next Magnesium check @ 6hrs from start, likely around 11pm, per patient she would like to wait to start magnesium because she wants to eat and breastfeed, discussed with patient the importance of starting the magnesium as soon as possible, patient states she understands but would like to wait to she is ready, magnesium at bedside waiting for patient   Follow up on Magnesium serum level   Draw full PEC labs q 12hr    2. GI: DASH DIET ,  3. : strict Is and Os  4. Renal Consult: discussed patient with Dr. Villalpando, she will come evaluate patient this evening, follow up on final recs, currently rec 300mg TID, to give first dose now
accelerated HTN post partum-  BP at goal 120-130/80, with mag drip and increase in labetalol to 300 mg Q8H    u/a without protein and creatinine has remained stable.  creat remains stable  LFT down a bit  hypocalcemia- repeat - if remains low will need to supplement  Anticipate dc of Mag drip about 5 PM- then will need to follow trend in BP readings-  anticipate that she will be stable for dc in AM -and will titrate labetalol as need  to keep at goal  no need to add second agent for BP control at this time

## 2017-11-05 DIAGNOSIS — E83.51 HYPOCALCEMIA: ICD-10-CM

## 2018-04-18 ENCOUNTER — RESULT REVIEW (OUTPATIENT)
Age: 33
End: 2018-04-18

## 2018-06-12 DIAGNOSIS — I10 ESSENTIAL (PRIMARY) HYPERTENSION: ICD-10-CM

## 2018-06-12 RX ORDER — LABETALOL HYDROCHLORIDE 100 MG/1
100 TABLET, FILM COATED ORAL EVERY 8 HOURS
Qty: 270 | Refills: 3 | Status: ACTIVE | COMMUNITY
Start: 2018-06-12 | End: 1900-01-01

## 2018-11-20 ENCOUNTER — APPOINTMENT (OUTPATIENT)
Dept: ULTRASOUND IMAGING | Facility: HOSPITAL | Age: 33
End: 2018-11-20

## 2018-11-20 ENCOUNTER — OUTPATIENT (OUTPATIENT)
Dept: OUTPATIENT SERVICES | Facility: HOSPITAL | Age: 33
LOS: 1 days | End: 2018-11-20
Payer: COMMERCIAL

## 2018-11-20 PROCEDURE — 76641 ULTRASOUND BREAST COMPLETE: CPT

## 2018-11-20 PROCEDURE — 76641 ULTRASOUND BREAST COMPLETE: CPT | Mod: 26,50

## 2019-04-01 ENCOUNTER — INPATIENT (INPATIENT)
Facility: HOSPITAL | Age: 34
LOS: 1 days | Discharge: HOME | End: 2019-04-03
Attending: OBSTETRICS & GYNECOLOGY | Admitting: OBSTETRICS & GYNECOLOGY
Payer: COMMERCIAL

## 2019-04-01 ENCOUNTER — RESULT REVIEW (OUTPATIENT)
Age: 34
End: 2019-04-01

## 2019-04-01 VITALS — WEIGHT: 191.8 LBS | HEIGHT: 67 IN

## 2019-04-01 DIAGNOSIS — Z3A.00 WEEKS OF GESTATION OF PREGNANCY NOT SPECIFIED: ICD-10-CM

## 2019-04-01 DIAGNOSIS — O26.899 OTHER SPECIFIED PREGNANCY RELATED CONDITIONS, UNSPECIFIED TRIMESTER: ICD-10-CM

## 2019-04-01 LAB
ALBUMIN SERPL ELPH-MCNC: 3.6 G/DL — SIGNIFICANT CHANGE UP (ref 3.3–5)
ALP SERPL-CCNC: 108 U/L — SIGNIFICANT CHANGE UP (ref 40–120)
ALT FLD-CCNC: 30 U/L — SIGNIFICANT CHANGE UP (ref 10–45)
ANION GAP SERPL CALC-SCNC: 11 MMOL/L — SIGNIFICANT CHANGE UP (ref 5–17)
APPEARANCE UR: CLEAR — SIGNIFICANT CHANGE UP
APTT BLD: 27.1 SEC — LOW (ref 27.5–36.3)
AST SERPL-CCNC: 40 U/L — SIGNIFICANT CHANGE UP (ref 10–40)
BASOPHILS # BLD AUTO: 0.01 K/UL — SIGNIFICANT CHANGE UP (ref 0–0.2)
BASOPHILS NFR BLD AUTO: 0.2 % — SIGNIFICANT CHANGE UP (ref 0–2)
BILIRUB SERPL-MCNC: 0.2 MG/DL — SIGNIFICANT CHANGE UP (ref 0.2–1.2)
BILIRUB UR-MCNC: NEGATIVE — SIGNIFICANT CHANGE UP
BLD GP AB SCN SERPL QL: NEGATIVE — SIGNIFICANT CHANGE UP
BUN SERPL-MCNC: 5 MG/DL — LOW (ref 7–23)
CALCIUM SERPL-MCNC: 9.7 MG/DL — SIGNIFICANT CHANGE UP (ref 8.4–10.5)
CHLORIDE SERPL-SCNC: 103 MMOL/L — SIGNIFICANT CHANGE UP (ref 96–108)
CO2 SERPL-SCNC: 23 MMOL/L — SIGNIFICANT CHANGE UP (ref 22–31)
COLOR SPEC: YELLOW — SIGNIFICANT CHANGE UP
CREAT SERPL-MCNC: 0.73 MG/DL — SIGNIFICANT CHANGE UP (ref 0.5–1.3)
DIFF PNL FLD: NEGATIVE — SIGNIFICANT CHANGE UP
EOSINOPHIL # BLD AUTO: 0.04 K/UL — SIGNIFICANT CHANGE UP (ref 0–0.5)
EOSINOPHIL NFR BLD AUTO: 0.6 % — SIGNIFICANT CHANGE UP (ref 0–6)
FIBRINOGEN PPP-MCNC: 409 MG/DL — SIGNIFICANT CHANGE UP (ref 258–438)
GLUCOSE SERPL-MCNC: 79 MG/DL — SIGNIFICANT CHANGE UP (ref 70–99)
GLUCOSE UR QL: NEGATIVE — SIGNIFICANT CHANGE UP
HCT VFR BLD CALC: 36.7 % — SIGNIFICANT CHANGE UP (ref 34.5–45)
HGB BLD-MCNC: 11.6 G/DL — SIGNIFICANT CHANGE UP (ref 11.5–15.5)
IMM GRANULOCYTES NFR BLD AUTO: 0.3 % — SIGNIFICANT CHANGE UP (ref 0–1.5)
INR BLD: 0.9 — SIGNIFICANT CHANGE UP (ref 0.88–1.16)
KETONES UR-MCNC: NEGATIVE — SIGNIFICANT CHANGE UP
LDH SERPL L TO P-CCNC: 255 U/L — HIGH (ref 50–242)
LEUKOCYTE ESTERASE UR-ACNC: ABNORMAL
LYMPHOCYTES # BLD AUTO: 0.82 K/UL — LOW (ref 1–3.3)
LYMPHOCYTES # BLD AUTO: 13.2 % — SIGNIFICANT CHANGE UP (ref 13–44)
MCHC RBC-ENTMCNC: 27.9 PG — SIGNIFICANT CHANGE UP (ref 27–34)
MCHC RBC-ENTMCNC: 31.6 GM/DL — LOW (ref 32–36)
MCV RBC AUTO: 88.2 FL — SIGNIFICANT CHANGE UP (ref 80–100)
MONOCYTES # BLD AUTO: 0.53 K/UL — SIGNIFICANT CHANGE UP (ref 0–0.9)
MONOCYTES NFR BLD AUTO: 8.6 % — SIGNIFICANT CHANGE UP (ref 2–14)
NEUTROPHILS # BLD AUTO: 4.77 K/UL — SIGNIFICANT CHANGE UP (ref 1.8–7.4)
NEUTROPHILS NFR BLD AUTO: 77.1 % — HIGH (ref 43–77)
NITRITE UR-MCNC: NEGATIVE — SIGNIFICANT CHANGE UP
NRBC # BLD: 0 /100 WBCS — SIGNIFICANT CHANGE UP (ref 0–0)
PH UR: 7.5 — SIGNIFICANT CHANGE UP (ref 5–8)
PLATELET # BLD AUTO: 136 K/UL — LOW (ref 150–400)
POTASSIUM SERPL-MCNC: 4.3 MMOL/L — SIGNIFICANT CHANGE UP (ref 3.5–5.3)
POTASSIUM SERPL-SCNC: 4.3 MMOL/L — SIGNIFICANT CHANGE UP (ref 3.5–5.3)
PROT SERPL-MCNC: 6.8 G/DL — SIGNIFICANT CHANGE UP (ref 6–8.3)
PROT UR-MCNC: NEGATIVE MG/DL — SIGNIFICANT CHANGE UP
PROTHROM AB SERPL-ACNC: 10.1 SEC — SIGNIFICANT CHANGE UP (ref 10–12.9)
RBC # BLD: 4.16 M/UL — SIGNIFICANT CHANGE UP (ref 3.8–5.2)
RBC # FLD: 12.5 % — SIGNIFICANT CHANGE UP (ref 10.3–14.5)
RH IG SCN BLD-IMP: POSITIVE — SIGNIFICANT CHANGE UP
SODIUM SERPL-SCNC: 137 MMOL/L — SIGNIFICANT CHANGE UP (ref 135–145)
SP GR SPEC: 1.01 — SIGNIFICANT CHANGE UP (ref 1–1.03)
URATE SERPL-MCNC: 4.2 MG/DL — SIGNIFICANT CHANGE UP (ref 2.5–7)
UROBILINOGEN FLD QL: 0.2 E.U./DL — SIGNIFICANT CHANGE UP
WBC # BLD: 6.19 K/UL — SIGNIFICANT CHANGE UP (ref 3.8–10.5)
WBC # FLD AUTO: 6.19 K/UL — SIGNIFICANT CHANGE UP (ref 3.8–10.5)

## 2019-04-01 RX ORDER — HYDROCORTISONE 1 %
1 OINTMENT (GRAM) TOPICAL EVERY 4 HOURS
Qty: 0 | Refills: 0 | Status: DISCONTINUED | OUTPATIENT
Start: 2019-04-01 | End: 2019-04-03

## 2019-04-01 RX ORDER — SODIUM CHLORIDE 9 MG/ML
3 INJECTION INTRAMUSCULAR; INTRAVENOUS; SUBCUTANEOUS EVERY 8 HOURS
Qty: 0 | Refills: 0 | Status: DISCONTINUED | OUTPATIENT
Start: 2019-04-01 | End: 2019-04-03

## 2019-04-01 RX ORDER — ACETAMINOPHEN 500 MG
650 TABLET ORAL EVERY 6 HOURS
Qty: 0 | Refills: 0 | Status: DISCONTINUED | OUTPATIENT
Start: 2019-04-01 | End: 2019-04-02

## 2019-04-01 RX ORDER — PENICILLIN G POTASSIUM 5000000 [IU]/1
2.5 POWDER, FOR SOLUTION INTRAMUSCULAR; INTRAPLEURAL; INTRATHECAL; INTRAVENOUS EVERY 4 HOURS
Qty: 0 | Refills: 0 | Status: DISCONTINUED | OUTPATIENT
Start: 2019-04-01 | End: 2019-04-01

## 2019-04-01 RX ORDER — OXYTOCIN 10 UNIT/ML
1 VIAL (ML) INJECTION
Qty: 30 | Refills: 0 | Status: DISCONTINUED | OUTPATIENT
Start: 2019-04-01 | End: 2019-04-03

## 2019-04-01 RX ORDER — GLYCERIN ADULT
1 SUPPOSITORY, RECTAL RECTAL AT BEDTIME
Qty: 0 | Refills: 0 | Status: DISCONTINUED | OUTPATIENT
Start: 2019-04-01 | End: 2019-04-03

## 2019-04-01 RX ORDER — TETANUS TOXOID, REDUCED DIPHTHERIA TOXOID AND ACELLULAR PERTUSSIS VACCINE, ADSORBED 5; 2.5; 8; 8; 2.5 [IU]/.5ML; [IU]/.5ML; UG/.5ML; UG/.5ML; UG/.5ML
0.5 SUSPENSION INTRAMUSCULAR ONCE
Qty: 0 | Refills: 0 | Status: DISCONTINUED | OUTPATIENT
Start: 2019-04-01 | End: 2019-04-03

## 2019-04-01 RX ORDER — SODIUM CHLORIDE 9 MG/ML
1000 INJECTION, SOLUTION INTRAVENOUS ONCE
Qty: 0 | Refills: 0 | Status: DISCONTINUED | OUTPATIENT
Start: 2019-04-01 | End: 2019-04-01

## 2019-04-01 RX ORDER — DIPHENHYDRAMINE HCL 50 MG
25 CAPSULE ORAL EVERY 6 HOURS
Qty: 0 | Refills: 0 | Status: DISCONTINUED | OUTPATIENT
Start: 2019-04-01 | End: 2019-04-03

## 2019-04-01 RX ORDER — SODIUM CHLORIDE 9 MG/ML
1000 INJECTION, SOLUTION INTRAVENOUS
Qty: 0 | Refills: 0 | Status: DISCONTINUED | OUTPATIENT
Start: 2019-04-01 | End: 2019-04-02

## 2019-04-01 RX ORDER — SIMETHICONE 80 MG/1
80 TABLET, CHEWABLE ORAL EVERY 6 HOURS
Qty: 0 | Refills: 0 | Status: DISCONTINUED | OUTPATIENT
Start: 2019-04-01 | End: 2019-04-03

## 2019-04-01 RX ORDER — OXYTOCIN 10 UNIT/ML
41.67 VIAL (ML) INJECTION
Qty: 20 | Refills: 0 | Status: DISCONTINUED | OUTPATIENT
Start: 2019-04-01 | End: 2019-04-03

## 2019-04-01 RX ORDER — OXYTOCIN 10 UNIT/ML
333.33 VIAL (ML) INJECTION
Qty: 20 | Refills: 0 | Status: DISCONTINUED | OUTPATIENT
Start: 2019-04-01 | End: 2019-04-01

## 2019-04-01 RX ORDER — DIBUCAINE 1 %
1 OINTMENT (GRAM) RECTAL EVERY 4 HOURS
Qty: 0 | Refills: 0 | Status: DISCONTINUED | OUTPATIENT
Start: 2019-04-01 | End: 2019-04-03

## 2019-04-01 RX ORDER — PENICILLIN G POTASSIUM 5000000 [IU]/1
5 POWDER, FOR SOLUTION INTRAMUSCULAR; INTRAPLEURAL; INTRATHECAL; INTRAVENOUS ONCE
Qty: 0 | Refills: 0 | Status: COMPLETED | OUTPATIENT
Start: 2019-04-01 | End: 2019-04-01

## 2019-04-01 RX ORDER — CITRIC ACID/SODIUM CITRATE 300-500 MG
15 SOLUTION, ORAL ORAL EVERY 4 HOURS
Qty: 0 | Refills: 0 | Status: DISCONTINUED | OUTPATIENT
Start: 2019-04-01 | End: 2019-04-01

## 2019-04-01 RX ORDER — PENICILLIN G POTASSIUM 5000000 [IU]/1
POWDER, FOR SOLUTION INTRAMUSCULAR; INTRAPLEURAL; INTRATHECAL; INTRAVENOUS
Qty: 0 | Refills: 0 | Status: DISCONTINUED | OUTPATIENT
Start: 2019-04-01 | End: 2019-04-01

## 2019-04-01 RX ORDER — MAGNESIUM HYDROXIDE 400 MG/1
30 TABLET, CHEWABLE ORAL
Qty: 0 | Refills: 0 | Status: DISCONTINUED | OUTPATIENT
Start: 2019-04-01 | End: 2019-04-03

## 2019-04-01 RX ORDER — DOCUSATE SODIUM 100 MG
100 CAPSULE ORAL
Qty: 0 | Refills: 0 | Status: DISCONTINUED | OUTPATIENT
Start: 2019-04-01 | End: 2019-04-03

## 2019-04-01 RX ORDER — FENTANYL/BUPIVACAINE/NS/PF 2MCG/ML-.1
250 PLASTIC BAG, INJECTION (ML) INJECTION
Qty: 0 | Refills: 0 | Status: DISCONTINUED | OUTPATIENT
Start: 2019-04-01 | End: 2019-04-03

## 2019-04-01 RX ORDER — AER TRAVELER 0.5 G/1
1 SOLUTION RECTAL; TOPICAL EVERY 4 HOURS
Qty: 0 | Refills: 0 | Status: DISCONTINUED | OUTPATIENT
Start: 2019-04-01 | End: 2019-04-03

## 2019-04-01 RX ORDER — SODIUM CHLORIDE 9 MG/ML
1000 INJECTION, SOLUTION INTRAVENOUS
Qty: 0 | Refills: 0 | Status: DISCONTINUED | OUTPATIENT
Start: 2019-04-01 | End: 2019-04-01

## 2019-04-01 RX ORDER — LANOLIN
1 OINTMENT (GRAM) TOPICAL EVERY 6 HOURS
Qty: 0 | Refills: 0 | Status: DISCONTINUED | OUTPATIENT
Start: 2019-04-01 | End: 2019-04-03

## 2019-04-01 RX ORDER — IBUPROFEN 200 MG
600 TABLET ORAL EVERY 6 HOURS
Qty: 0 | Refills: 0 | Status: DISCONTINUED | OUTPATIENT
Start: 2019-04-01 | End: 2019-04-03

## 2019-04-01 RX ORDER — PRAMOXINE HYDROCHLORIDE 150 MG/15G
1 AEROSOL, FOAM RECTAL EVERY 4 HOURS
Qty: 0 | Refills: 0 | Status: DISCONTINUED | OUTPATIENT
Start: 2019-04-01 | End: 2019-04-03

## 2019-04-01 RX ADMIN — SODIUM CHLORIDE 125 MILLILITER(S): 9 INJECTION, SOLUTION INTRAVENOUS at 14:15

## 2019-04-01 RX ADMIN — Medication 600 MILLIGRAM(S): at 23:12

## 2019-04-01 RX ADMIN — Medication 1 MILLIUNIT(S)/MIN: at 17:15

## 2019-04-01 RX ADMIN — PENICILLIN G POTASSIUM 100 MILLION UNIT(S): 5000000 POWDER, FOR SOLUTION INTRAMUSCULAR; INTRAPLEURAL; INTRATHECAL; INTRAVENOUS at 11:20

## 2019-04-01 RX ADMIN — PENICILLIN G POTASSIUM 100 MILLION UNIT(S): 5000000 POWDER, FOR SOLUTION INTRAMUSCULAR; INTRAPLEURAL; INTRATHECAL; INTRAVENOUS at 15:30

## 2019-04-01 RX ADMIN — Medication 125 MILLIUNIT(S)/MIN: at 19:00

## 2019-04-01 RX ADMIN — SODIUM CHLORIDE 125 MILLILITER(S): 9 INJECTION, SOLUTION INTRAVENOUS at 14:18

## 2019-04-01 RX ADMIN — Medication 600 MILLIGRAM(S): at 22:12

## 2019-04-02 ENCOUNTER — TRANSCRIPTION ENCOUNTER (OUTPATIENT)
Age: 34
End: 2019-04-02

## 2019-04-02 LAB — T PALLIDUM AB TITR SER: NEGATIVE — SIGNIFICANT CHANGE UP

## 2019-04-02 RX ORDER — AER TRAVELER 0.5 G/1
1 SOLUTION RECTAL; TOPICAL
Qty: 0 | Refills: 0 | COMMUNITY
Start: 2019-04-02

## 2019-04-02 RX ORDER — ACETAMINOPHEN 500 MG
650 TABLET ORAL ONCE
Qty: 0 | Refills: 0 | Status: COMPLETED | OUTPATIENT
Start: 2019-04-02 | End: 2019-04-03

## 2019-04-02 RX ORDER — ACETAMINOPHEN 500 MG
975 TABLET ORAL ONCE
Qty: 0 | Refills: 0 | Status: COMPLETED | OUTPATIENT
Start: 2019-04-02 | End: 2019-04-02

## 2019-04-02 RX ORDER — IBUPROFEN 200 MG
1 TABLET ORAL
Qty: 0 | Refills: 0 | COMMUNITY
Start: 2019-04-02

## 2019-04-02 RX ORDER — OXYCODONE AND ACETAMINOPHEN 5; 325 MG/1; MG/1
2 TABLET ORAL EVERY 6 HOURS
Qty: 0 | Refills: 0 | Status: DISCONTINUED | OUTPATIENT
Start: 2019-04-02 | End: 2019-04-03

## 2019-04-02 RX ORDER — LANOLIN
1 OINTMENT (GRAM) TOPICAL
Qty: 0 | Refills: 0 | COMMUNITY
Start: 2019-04-02

## 2019-04-02 RX ORDER — OXYCODONE AND ACETAMINOPHEN 5; 325 MG/1; MG/1
1 TABLET ORAL EVERY 6 HOURS
Qty: 0 | Refills: 0 | Status: DISCONTINUED | OUTPATIENT
Start: 2019-04-02 | End: 2019-04-03

## 2019-04-02 RX ADMIN — Medication 650 MILLIGRAM(S): at 00:09

## 2019-04-02 RX ADMIN — Medication 975 MILLIGRAM(S): at 22:59

## 2019-04-02 RX ADMIN — Medication 650 MILLIGRAM(S): at 16:00

## 2019-04-02 RX ADMIN — Medication 650 MILLIGRAM(S): at 08:44

## 2019-04-02 RX ADMIN — Medication 100 MILLIGRAM(S): at 12:54

## 2019-04-02 RX ADMIN — Medication 600 MILLIGRAM(S): at 18:30

## 2019-04-02 RX ADMIN — Medication 1 TABLET(S): at 12:54

## 2019-04-02 RX ADMIN — Medication 600 MILLIGRAM(S): at 05:05

## 2019-04-02 RX ADMIN — Medication 600 MILLIGRAM(S): at 17:37

## 2019-04-02 RX ADMIN — Medication 600 MILLIGRAM(S): at 06:05

## 2019-04-02 RX ADMIN — Medication 650 MILLIGRAM(S): at 01:09

## 2019-04-02 RX ADMIN — Medication 1 APPLICATION(S): at 17:46

## 2019-04-02 RX ADMIN — Medication 650 MILLIGRAM(S): at 15:12

## 2019-04-02 RX ADMIN — Medication 600 MILLIGRAM(S): at 12:00

## 2019-04-02 RX ADMIN — SODIUM CHLORIDE 3 MILLILITER(S): 9 INJECTION INTRAMUSCULAR; INTRAVENOUS; SUBCUTANEOUS at 15:16

## 2019-04-02 RX ADMIN — SODIUM CHLORIDE 3 MILLILITER(S): 9 INJECTION INTRAMUSCULAR; INTRAVENOUS; SUBCUTANEOUS at 06:32

## 2019-04-02 RX ADMIN — Medication 600 MILLIGRAM(S): at 11:05

## 2019-04-02 RX ADMIN — Medication 650 MILLIGRAM(S): at 09:40

## 2019-04-02 NOTE — DISCHARGE NOTE OB - MEDICATION SUMMARY - MEDICATIONS TO STOP TAKING
I will STOP taking the medications listed below when I get home from the hospital:    labetalol 300 mg oral tablet  -- 1 tab(s) by mouth every 8 hours

## 2019-04-02 NOTE — LACTATION INITIAL EVALUATION - LATCH
shallow latch/lips widely flanged/baby appears to take in large amount of areola, but mom reports pain and nipple has compression after latch. mom to f/u with ent or dds after discharge for tongue tie eval and treatment

## 2019-04-02 NOTE — CHART NOTE - NSCHARTNOTEFT_GEN_A_CORE
Patient evaluated at bedside c/o R hip pain that began today. Patient had an uncomplicated  yesterday then today began feeling R hip pain that radiates down the lateral aspect for her leg to just below her knee, achy/cramping pain, constant and dull. Patient improves with certain positions. She has tried Motrin with little improvement. No bruising appreciated, no lower calf pain, VSS. Patient has full ROM at hip and knee joint and sensation is intact. Pain likely MSK due to labor/pushing. Will try Tylenol 975mg now (patient does not want percocet) and ice packs/ heating pads. Continue to monitor.

## 2019-04-02 NOTE — PROGRESS NOTE ADULT - SUBJECTIVE AND OBJECTIVE BOX
Patient evaluated at bedside.     She reports pain is well controlled.    She has been ambulating without assistance, voiding spontaneously, and is breastfeeding.    She denies HA, dizziness, chest pain, palpitations, shortness of breath, n/v, heavy vaginal bleeding or perineal discomfort.    Physical Exam:  Vital Signs Last 24 Hrs  T(C): 36.6 (02 Apr 2019 06:33), Max: 36.7 (01 Apr 2019 21:30)  T(F): 97.8 (02 Apr 2019 06:33), Max: 98.1 (01 Apr 2019 21:30)  HR: 82 (02 Apr 2019 06:33) (76 - 92)  BP: 121/68 (02 Apr 2019 06:33) (111/62 - 143/71)  BP(mean): 75 (01 Apr 2019 21:00) (75 - 81)  RR: 18 (02 Apr 2019 06:33) (16 - 19)  SpO2: 98% (02 Apr 2019 06:33) (97% - 100%)    GA: NAD, A+0 x 3  Abd: + BS, soft, nontender, nondistended, no rebound or guarding, uterus firm at midline  : lochia WNL  Extremities: no edema or calf tenderness                          11.6   6.19  )-----------( 136      ( 01 Apr 2019 12:16 )             36.7     04-01    137  |  103  |  5<L>  ----------------------------<  79  4.3   |  23  |  0.73    Ca    9.7      01 Apr 2019 12:29    TPro  6.8  /  Alb  3.6  /  TBili  0.2  /  DBili  x   /  AST  40  /  ALT  30  /  AlkPhos  108  04-01    PT/INR - ( 01 Apr 2019 12:30 )   PT: 10.1 sec;   INR: 0.90          PTT - ( 01 Apr 2019 12:30 )  PTT:27.1 sec

## 2019-04-02 NOTE — DISCHARGE NOTE OB - CARE PLAN
Principal Discharge DX:	Postpartum state  Goal:	To feel well.  Assessment and plan of treatment:	Meeting all postpartum milestones. Safe for discharge. Follow up in 6 weeks.

## 2019-04-02 NOTE — DISCHARGE NOTE OB - MEDICATION SUMMARY - MEDICATIONS TO TAKE
I will START or STAY ON the medications listed below when I get home from the hospital:    ibuprofen 600 mg oral tablet  -- 1 tab(s) by mouth every 6 hours  -- Indication: For Pain    witch hazel 50% topical pad  -- 1 application on skin every 4 hours, As needed, Perineal Discomfort  -- Indication: For Perineal discomfort    lanolin topical ointment  -- 1 application on skin every 6 hours, As needed, Sore Nipples  -- Indication: For Sore nipples

## 2019-04-02 NOTE — DISCHARGE NOTE OB - PATIENT PORTAL LINK FT
You can access the [facility] Patient Portal, offered by [facility], by registering with the following website: [email or web address]

## 2019-04-02 NOTE — DISCHARGE NOTE OB - CARE PROVIDER_API CALL
Bennett Mckeon)  Obstetrics and Gynecology  [address] [place] [address]  Phone: [phone]  Fax: [phone]  Follow Up Time:

## 2019-04-02 NOTE — PROGRESS NOTE ADULT - SUBJECTIVE AND OBJECTIVE BOX
PPD #1  No complaints.  VS stable.  Fundus firm, lochia normal.  Pt. stable.  Routine pp care.   For d/c home in am.  ALCIDES Mckeon M.D.

## 2019-04-02 NOTE — LACTATION INITIAL EVALUATION - NS LACT CON REASON FOR REQ
18hr old  s/p vaginal delivery at 38.4wks. mother  her first child for approx 11M but states she was readmitted with PP Preeclampsia and  from  for a week, when she had to pump and bottle feed which disrupted breastfeeding and she had to work hard to get her baby back on the breast.  reported to be latching well although mom reports painful latch. pediatrician noted a tongue tie and advised mom to f/u with ibclc. 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 baby observed latched on in cross cradle hold with slow, rhythmic sucking. mom reports pain 3-4/10. mild nipple compression noted after latch. we made some positioning adjustments with a better breast sandwiching and mother reports increased comfort and nipple appeared less compressed after latch. we discussed s/s tongue tie and provided resources for f/u. reviewed bfing basics, positioning techniques, feeding/satiety cues, signs of good latch, and encouraged s2s contact. advised responsive feeding 8-12x/day.  mother aware she may begin pumping or hand expression q2-3h if latch becomes intolerable or if there is evidence of poor transfer such as low output or excessive wt loss. mother reports she may begin pumping by choice and having family bottle feed EBM to maximize her sleep and she wants to have a large freezer stash available. answered all questions and made her aware that pumping overnight will still be indicated for long term milk production and engorgement prevention./multiparous mom

## 2019-04-03 VITALS
OXYGEN SATURATION: 97 % | SYSTOLIC BLOOD PRESSURE: 129 MMHG | DIASTOLIC BLOOD PRESSURE: 82 MMHG | TEMPERATURE: 98 F | HEART RATE: 98 BPM | RESPIRATION RATE: 18 BRPM

## 2019-04-03 PROCEDURE — 86850 RBC ANTIBODY SCREEN: CPT

## 2019-04-03 PROCEDURE — 85730 THROMBOPLASTIN TIME PARTIAL: CPT

## 2019-04-03 PROCEDURE — 85025 COMPLETE CBC W/AUTO DIFF WBC: CPT

## 2019-04-03 PROCEDURE — 99214 OFFICE O/P EST MOD 30 MIN: CPT

## 2019-04-03 PROCEDURE — 86900 BLOOD TYPING SEROLOGIC ABO: CPT

## 2019-04-03 PROCEDURE — 83615 LACTATE (LD) (LDH) ENZYME: CPT

## 2019-04-03 PROCEDURE — 86901 BLOOD TYPING SEROLOGIC RH(D): CPT

## 2019-04-03 PROCEDURE — 84550 ASSAY OF BLOOD/URIC ACID: CPT

## 2019-04-03 PROCEDURE — 80053 COMPREHEN METABOLIC PANEL: CPT

## 2019-04-03 PROCEDURE — 85610 PROTHROMBIN TIME: CPT

## 2019-04-03 PROCEDURE — 85384 FIBRINOGEN ACTIVITY: CPT

## 2019-04-03 PROCEDURE — 88307 TISSUE EXAM BY PATHOLOGIST: CPT

## 2019-04-03 PROCEDURE — 86780 TREPONEMA PALLIDUM: CPT

## 2019-04-03 PROCEDURE — 81001 URINALYSIS AUTO W/SCOPE: CPT

## 2019-04-03 RX ORDER — ACETAMINOPHEN 500 MG
650 TABLET ORAL EVERY 6 HOURS
Qty: 0 | Refills: 0 | Status: DISCONTINUED | OUTPATIENT
Start: 2019-04-03 | End: 2019-04-03

## 2019-04-03 RX ADMIN — Medication 650 MILLIGRAM(S): at 16:55

## 2019-04-03 RX ADMIN — Medication 650 MILLIGRAM(S): at 10:28

## 2019-04-03 RX ADMIN — Medication 1 APPLICATION(S): at 10:27

## 2019-04-03 RX ADMIN — Medication 650 MILLIGRAM(S): at 05:45

## 2019-04-03 RX ADMIN — Medication 600 MILLIGRAM(S): at 01:17

## 2019-04-03 RX ADMIN — Medication 650 MILLIGRAM(S): at 16:03

## 2019-04-03 RX ADMIN — Medication 100 MILLIGRAM(S): at 12:08

## 2019-04-03 RX ADMIN — Medication 600 MILLIGRAM(S): at 00:27

## 2019-04-03 RX ADMIN — Medication 600 MILLIGRAM(S): at 07:12

## 2019-04-03 RX ADMIN — Medication 650 MILLIGRAM(S): at 11:02

## 2019-04-03 RX ADMIN — Medication 600 MILLIGRAM(S): at 07:22

## 2019-04-03 RX ADMIN — Medication 600 MILLIGRAM(S): at 13:34

## 2019-04-03 RX ADMIN — Medication 600 MILLIGRAM(S): at 13:03

## 2019-04-03 RX ADMIN — Medication 650 MILLIGRAM(S): at 04:53

## 2019-04-03 RX ADMIN — Medication 975 MILLIGRAM(S): at 00:27

## 2019-04-03 RX ADMIN — Medication 1 TABLET(S): at 12:09

## 2019-04-03 NOTE — PROGRESS NOTE ADULT - SUBJECTIVE AND OBJECTIVE BOX
PPD #2  Other than tired no complaints.  Afebrile, VS stable.  Fundus firm, lochia normal.  All well.  D/c home, instructions given.  ALCIDES Mckeon M.D.

## 2019-04-03 NOTE — PROGRESS NOTE ADULT - SUBJECTIVE AND OBJECTIVE BOX
Patient evaluated at bedside.   She reports pain is well controlled.    She has been ambulating without assistance, voiding spontaneously, and is breastfeeding.    She denies HA, dizziness, chest pain, palpitations, shortness of breathe, n/v, heavy vaginal bleeding or perineal discomfort.    Physical Exam:  Vital Signs Last 24 Hrs  T(C): 36.6 (03 Apr 2019 06:00), Max: 36.7 (02 Apr 2019 18:39)  T(F): 97.9 (03 Apr 2019 06:00), Max: 98.1 (02 Apr 2019 18:39)  HR: 98 (03 Apr 2019 06:00) (80 - 98)  BP: 129/82 (03 Apr 2019 06:00) (121/70 - 129/82)  BP(mean): --  RR: 18 (03 Apr 2019 06:00) (18 - 18)  SpO2: 97% (03 Apr 2019 06:00) (97% - 100%)    GA: NAD, A+0 x 3  Abd: + BS, soft, nontender, nondistended, no rebound or guarding, uterus firm at midline  : lochia WNL  Extremities: no swelling or calf tenderness                          11.6   6.19  )-----------( 136      ( 01 Apr 2019 12:16 )             36.7     04-01    137  |  103  |  5<L>  ----------------------------<  79  4.3   |  23  |  0.73    Ca    9.7      01 Apr 2019 12:29    TPro  6.8  /  Alb  3.6  /  TBili  0.2  /  DBili  x   /  AST  40  /  ALT  30  /  AlkPhos  108  04-01    PT/INR - ( 01 Apr 2019 12:30 )   PT: 10.1 sec;   INR: 0.90          PTT - ( 01 Apr 2019 12:30 )  PTT:27.1 sec    MEDICATIONS  (STANDING):  diphtheria/tetanus/pertussis (acellular) Vaccine (ADAcel) 0.5 milliLiter(s) IntraMuscular once  fentaNYL (2 MICROgram(s)/mL) + BUpivacaine 0.1% in 0.9% Sodium Chloride PCEA 250 milliLiter(s) Epidural PCA Continuous  ibuprofen  Tablet. 600 milliGRAM(s) Oral every 6 hours  oxytocin Infusion 41.667 milliUNIT(s)/Min (125 mL/Hr) IV Continuous <Continuous>  oxytocin Infusion 1 milliUNIT(s)/Min (1 mL/Hr) IV Continuous <Continuous>  prenatal multivitamin 1 Tablet(s) Oral daily  sodium chloride 0.9% lock flush 3 milliLiter(s) IV Push every 8 hours    MEDICATIONS  (PRN):  acetaminophen   Tablet .. 650 milliGRAM(s) Oral every 6 hours PRN Mild Pain (1 - 3)  dibucaine 1% Ointment 1 Application(s) Topical every 4 hours PRN Perineal Discomfort  diphenhydrAMINE 25 milliGRAM(s) Oral every 6 hours PRN Itching  docusate sodium 100 milliGRAM(s) Oral two times a day PRN Stool Softening  glycerin Suppository - Adult 1 Suppository(s) Rectal at bedtime PRN Constipation  hydrocortisone 1% Cream 1 Application(s) Topical every 4 hours PRN Moderate to Severe Perineal Pain  lanolin Ointment 1 Application(s) Topical every 6 hours PRN Sore Nipples  magnesium hydroxide Suspension 30 milliLiter(s) Oral two times a day PRN Constipation  oxyCODONE    5 mG/acetaminophen 325 mG 1 Tablet(s) Oral every 6 hours PRN Moderate Pain (4 - 6)  oxyCODONE    5 mG/acetaminophen 325 mG 2 Tablet(s) Oral every 6 hours PRN Severe Pain (7 - 10)  pramoxine 1%/zinc 5% Cream 1 Application(s) Topical every 4 hours PRN Moderate to Severe Perineal Pain  simethicone 80 milliGRAM(s) Chew every 6 hours PRN Gas  witch hazel Pads 1 Application(s) Topical every 4 hours PRN Perineal Discomfort

## 2019-04-05 DIAGNOSIS — Z34.03 ENCOUNTER FOR SUPERVISION OF NORMAL FIRST PREGNANCY, THIRD TRIMESTER: ICD-10-CM

## 2019-04-05 DIAGNOSIS — Z3A.39 39 WEEKS GESTATION OF PREGNANCY: ICD-10-CM

## 2019-04-08 LAB — SURGICAL PATHOLOGY STUDY: SIGNIFICANT CHANGE UP

## 2020-09-24 ENCOUNTER — OUTPATIENT (OUTPATIENT)
Dept: OUTPATIENT SERVICES | Facility: HOSPITAL | Age: 35
LOS: 1 days | End: 2020-09-24
Payer: COMMERCIAL

## 2020-09-24 ENCOUNTER — APPOINTMENT (OUTPATIENT)
Dept: ULTRASOUND IMAGING | Facility: HOSPITAL | Age: 35
End: 2020-09-24
Payer: COMMERCIAL

## 2020-09-24 PROCEDURE — G0279: CPT | Mod: 26

## 2020-09-24 PROCEDURE — G0279: CPT

## 2020-09-24 PROCEDURE — 76641 ULTRASOUND BREAST COMPLETE: CPT

## 2020-09-24 PROCEDURE — 77066 DX MAMMO INCL CAD BI: CPT | Mod: 26

## 2020-09-24 PROCEDURE — 76641 ULTRASOUND BREAST COMPLETE: CPT | Mod: 26,50

## 2020-09-24 PROCEDURE — 77066 DX MAMMO INCL CAD BI: CPT

## 2020-09-30 ENCOUNTER — RESULT REVIEW (OUTPATIENT)
Age: 35
End: 2020-09-30

## 2020-09-30 ENCOUNTER — OUTPATIENT (OUTPATIENT)
Dept: OUTPATIENT SERVICES | Facility: HOSPITAL | Age: 35
LOS: 1 days | End: 2020-09-30
Payer: COMMERCIAL

## 2020-09-30 ENCOUNTER — APPOINTMENT (OUTPATIENT)
Dept: ULTRASOUND IMAGING | Facility: HOSPITAL | Age: 35
End: 2020-09-30
Payer: COMMERCIAL

## 2020-09-30 PROCEDURE — 88305 TISSUE EXAM BY PATHOLOGIST: CPT | Mod: 26

## 2020-09-30 PROCEDURE — 88305 TISSUE EXAM BY PATHOLOGIST: CPT

## 2020-09-30 PROCEDURE — 77065 DX MAMMO INCL CAD UNI: CPT | Mod: 26,LT

## 2020-09-30 PROCEDURE — 19083 BX BREAST 1ST LESION US IMAG: CPT | Mod: LT

## 2020-09-30 PROCEDURE — 77065 DX MAMMO INCL CAD UNI: CPT

## 2020-09-30 PROCEDURE — 19083 BX BREAST 1ST LESION US IMAG: CPT

## 2020-09-30 PROCEDURE — A4648: CPT

## 2020-10-02 LAB — SURGICAL PATHOLOGY STUDY: SIGNIFICANT CHANGE UP

## 2021-07-07 NOTE — PATIENT PROFILE OB - BABY A: DATE/TIME OF DELIVERY
*Contacted pt to schedule appointment, but pt is already scheduled with Dr. Lindsey for testing on 7/14.     During the call pt informed office that pt will be undergoing a procedure that requires anesthesiology and wanted to let Dr. Lindsey know. Pt requested if Dr. Lindsey could recommend that an anesthesiologist be in the procedure at all times. Stated to pt that this information will be relayed to RN and Dr. Lindsey.    *Contacted Dr. Lindsey to inform him that pt is undergoing general anesthesiology and pt requested that anesthesiologist be available during the procedure.     No further follow-up completed and encounter closed.    Samantha Chandra  Clinic   Pulmonary Hypertension  [facility]  (P) [phone]   22-Oct-2017 10:31

## 2021-08-05 ENCOUNTER — APPOINTMENT (OUTPATIENT)
Dept: MAMMOGRAPHY | Facility: HOSPITAL | Age: 36
End: 2021-08-05
Payer: COMMERCIAL

## 2021-08-05 ENCOUNTER — APPOINTMENT (OUTPATIENT)
Dept: ULTRASOUND IMAGING | Facility: HOSPITAL | Age: 36
End: 2021-08-05

## 2021-08-05 ENCOUNTER — OUTPATIENT (OUTPATIENT)
Dept: OUTPATIENT SERVICES | Facility: HOSPITAL | Age: 36
LOS: 1 days | End: 2021-08-05
Payer: COMMERCIAL

## 2021-08-05 PROCEDURE — 77063 BREAST TOMOSYNTHESIS BI: CPT | Mod: 26

## 2021-08-05 PROCEDURE — 77063 BREAST TOMOSYNTHESIS BI: CPT

## 2021-08-05 PROCEDURE — 77067 SCR MAMMO BI INCL CAD: CPT | Mod: 26

## 2021-08-05 PROCEDURE — 77067 SCR MAMMO BI INCL CAD: CPT

## 2021-08-05 PROCEDURE — 76641 ULTRASOUND BREAST COMPLETE: CPT | Mod: 26,50

## 2021-08-05 PROCEDURE — 76641 ULTRASOUND BREAST COMPLETE: CPT

## 2022-02-28 ENCOUNTER — RESULT REVIEW (OUTPATIENT)
Age: 37
End: 2022-02-28

## 2022-03-03 ENCOUNTER — NON-APPOINTMENT (OUTPATIENT)
Age: 37
End: 2022-03-03

## 2022-03-04 ENCOUNTER — APPOINTMENT (OUTPATIENT)
Dept: OBGYN | Facility: CLINIC | Age: 37
End: 2022-03-04
Payer: COMMERCIAL

## 2022-03-04 VITALS
BODY MASS INDEX: 29.19 KG/M2 | HEIGHT: 67 IN | SYSTOLIC BLOOD PRESSURE: 125 MMHG | DIASTOLIC BLOOD PRESSURE: 77 MMHG | WEIGHT: 186 LBS

## 2022-03-04 DIAGNOSIS — O46.90 ANTEPARTUM HEMORRHAGE, UNSPECIFIED, UNSPECIFIED TRIMESTER: ICD-10-CM

## 2022-03-04 DIAGNOSIS — Z00.00 ENCOUNTER FOR GENERAL ADULT MEDICAL EXAMINATION W/OUT ABNORMAL FINDINGS: ICD-10-CM

## 2022-03-04 DIAGNOSIS — N92.6 IRREGULAR MENSTRUATION, UNSPECIFIED: ICD-10-CM

## 2022-03-04 DIAGNOSIS — O21.9 VOMITING OF PREGNANCY, UNSPECIFIED: ICD-10-CM

## 2022-03-04 PROCEDURE — 0500F INITIAL PRENATAL CARE VISIT: CPT

## 2022-03-04 PROCEDURE — 76830 TRANSVAGINAL US NON-OB: CPT

## 2022-03-04 RX ORDER — ONDANSETRON 4 MG/1
4 TABLET, ORALLY DISINTEGRATING ORAL
Qty: 14 | Refills: 2 | Status: ACTIVE | COMMUNITY
Start: 2022-03-04 | End: 1900-01-01

## 2022-03-04 RX ORDER — DOXYLAMINE SUCCINATE AND PYRIDOXINE HYDROCHLORIDE 10; 10 MG/1; MG/1
10-10 TABLET, DELAYED RELEASE ORAL
Qty: 60 | Refills: 0 | Status: ACTIVE | COMMUNITY
Start: 2022-03-04 | End: 1900-01-01

## 2022-03-04 RX ORDER — PRENATAL 71/IRON/FOLIC AC/DHA 30-1.4-2
30-1.4-2 CAPSULE,IMMEDIATE, DELAY RELEASE,BIPHASE ORAL
Qty: 30 | Refills: 12 | Status: ACTIVE | COMMUNITY
Start: 2022-03-04 | End: 1900-01-01

## 2022-03-09 NOTE — PROGRESS NOTE ADULT - PSYCHIATRIC
Patient resting in bed, alert and oriented. Left arm in sling. No s/s of acute distress on room air. Purewick in place, functioning with no difficulties. Patient verbalizes no needs at this time. Will continue to monitor and report off to oncoming nurse. Safety measures in place. negative

## 2022-03-10 ENCOUNTER — ASOB RESULT (OUTPATIENT)
Age: 37
End: 2022-03-10

## 2022-03-10 ENCOUNTER — APPOINTMENT (OUTPATIENT)
Dept: ANTEPARTUM | Facility: CLINIC | Age: 37
End: 2022-03-10
Payer: COMMERCIAL

## 2022-03-10 PROCEDURE — 76801 OB US < 14 WKS SINGLE FETUS: CPT

## 2022-03-11 PROBLEM — O46.90 VAGINAL BLEEDING IN PREGNANCY: Status: ACTIVE | Noted: 2022-03-11

## 2022-03-11 LAB
25(OH)D3 SERPL-MCNC: 22.5 NG/ML
ABO + RH PNL BLD: NORMAL
ALBUMIN SERPL ELPH-MCNC: 4.4 G/DL
ALP BLD-CCNC: 47 U/L
ALT SERPL-CCNC: 16 U/L
ANION GAP SERPL CALC-SCNC: 11 MMOL/L
AR GENE MUT ANL BLD/T: NORMAL
AST SERPL-CCNC: 15 U/L
BACTERIA UR CULT: NORMAL
BASOPHILS # BLD AUTO: 0.01 K/UL
BASOPHILS NFR BLD AUTO: 0.2 %
BILIRUB SERPL-MCNC: 0.2 MG/DL
BLD GP AB SCN SERPL QL: NORMAL
BUN SERPL-MCNC: 7 MG/DL
CALCIUM SERPL-MCNC: 9.4 MG/DL
CHLORIDE SERPL-SCNC: 103 MMOL/L
CMV IGG SERPL QL: >10 U/ML
CMV IGG SERPL-IMP: POSITIVE
CMV IGM SERPL QL: 8.6 AU/ML
CMV IGM SERPL QL: NEGATIVE
CO2 SERPL-SCNC: 23 MMOL/L
CREAT SERPL-MCNC: 0.77 MG/DL
EGFR: 102 ML/MIN/1.73M2
EOSINOPHIL # BLD AUTO: 0.04 K/UL
EOSINOPHIL NFR BLD AUTO: 0.7 %
ESTIMATED AVERAGE GLUCOSE: 111 MG/DL
FMR1 GENE MUT ANL BLD/T: NORMAL
GLUCOSE SERPL-MCNC: 75 MG/DL
HBA1C MFR BLD HPLC: 5.5 %
HBV SURFACE AG SER QL: NONREACTIVE
HCG SERPL-MCNC: ABNORMAL MIU/ML
HCT VFR BLD CALC: 38.4 %
HCV AB SER QL: NONREACTIVE
HCV S/CO RATIO: 0.19 S/CO
HGB A MFR BLD: 97.7 %
HGB A2 MFR BLD: 2.3 %
HGB BLD-MCNC: 12.2 G/DL
HGB FRACT BLD-IMP: NORMAL
HIV1+2 AB SPEC QL IA.RAPID: NONREACTIVE
IMM GRANULOCYTES NFR BLD AUTO: 0.2 %
LYMPHOCYTES # BLD AUTO: 1.16 K/UL
LYMPHOCYTES NFR BLD AUTO: 20.5 %
MAN DIFF?: NORMAL
MCHC RBC-ENTMCNC: 27.7 PG
MCHC RBC-ENTMCNC: 31.8 GM/DL
MCV RBC AUTO: 87.1 FL
MONOCYTES # BLD AUTO: 0.39 K/UL
MONOCYTES NFR BLD AUTO: 6.9 %
NEUTROPHILS # BLD AUTO: 4.06 K/UL
NEUTROPHILS NFR BLD AUTO: 71.5 %
PLATELET # BLD AUTO: 211 K/UL
POTASSIUM SERPL-SCNC: 4.4 MMOL/L
PROGEST SERPL-MCNC: 24.9 NG/ML
PROT SERPL-MCNC: 7.2 G/DL
RBC # BLD: 4.41 M/UL
RBC # FLD: 13.6 %
RUBV IGG FLD-ACNC: >33 INDEX
RUBV IGG SER-IMP: POSITIVE
SODIUM SERPL-SCNC: 138 MMOL/L
T GONDII AB SER-IMP: NEGATIVE
T GONDII IGG SER QL: <3 IU/ML
T PALLIDUM AB SER QL IA: NEGATIVE
TSH SERPL-ACNC: 1.09 UIU/ML
VZV AB TITR SER: POSITIVE
VZV IGG SER IF-ACNC: 2282 INDEX
WBC # FLD AUTO: 5.67 K/UL

## 2022-03-16 LAB — CFTR MUT TESTED BLD/T: NEGATIVE

## 2022-03-21 ENCOUNTER — APPOINTMENT (OUTPATIENT)
Dept: OBGYN | Facility: CLINIC | Age: 37
End: 2022-03-21
Payer: COMMERCIAL

## 2022-03-21 DIAGNOSIS — O09.521 SUPERVISION OF ELDERLY MULTIGRAVIDA, FIRST TRIMESTER: ICD-10-CM

## 2022-03-21 DIAGNOSIS — Z13.79 ENCOUNTER FOR OTHER SCREENING FOR GENETIC AND CHROMOSOMAL ANOMALIES: ICD-10-CM

## 2022-03-21 DIAGNOSIS — E03.2 HYPOTHYROIDISM DUE TO MEDICAMENTS AND OTHER EXOGENOUS SUBSTANCES: ICD-10-CM

## 2022-03-21 DIAGNOSIS — E03.9 HYPOTHYROIDISM, UNSPECIFIED: ICD-10-CM

## 2022-03-21 PROCEDURE — 99213 OFFICE O/P EST LOW 20 MIN: CPT

## 2022-03-25 ENCOUNTER — NON-APPOINTMENT (OUTPATIENT)
Age: 37
End: 2022-03-25

## 2022-03-25 PROBLEM — O09.521 MULTIGRAVIDA OF ADVANCED MATERNAL AGE IN FIRST TRIMESTER: Status: ACTIVE | Noted: 2022-03-25

## 2022-03-25 PROBLEM — Z13.79 GENETIC SCREENING: Status: ACTIVE | Noted: 2022-03-25

## 2022-03-25 PROBLEM — E03.9 HYPOTHYROID: Status: ACTIVE | Noted: 2022-03-25

## 2022-03-25 PROBLEM — E03.2 HYPOTHYROIDISM DUE TO NON-MEDICATION EXOGENOUS SUBSTANCES: Status: ACTIVE | Noted: 2022-03-25

## 2022-03-28 ENCOUNTER — NON-APPOINTMENT (OUTPATIENT)
Age: 37
End: 2022-03-28

## 2022-03-31 ENCOUNTER — NON-APPOINTMENT (OUTPATIENT)
Age: 37
End: 2022-03-31

## 2022-04-07 ENCOUNTER — APPOINTMENT (OUTPATIENT)
Dept: ANTEPARTUM | Facility: CLINIC | Age: 37
End: 2022-04-07

## 2022-04-26 ENCOUNTER — APPOINTMENT (OUTPATIENT)
Dept: OBGYN | Facility: CLINIC | Age: 37
End: 2022-04-26
Payer: COMMERCIAL

## 2022-04-26 VITALS
DIASTOLIC BLOOD PRESSURE: 83 MMHG | HEIGHT: 67 IN | SYSTOLIC BLOOD PRESSURE: 151 MMHG | BODY MASS INDEX: 29.19 KG/M2 | WEIGHT: 186 LBS

## 2022-04-26 DIAGNOSIS — Z09 ENCOUNTER FOR FOLLOW-UP EXAMINATION AFTER COMPLETED TREATMENT FOR CONDITIONS OTHER THAN MALIGNANT NEOPLASM: ICD-10-CM

## 2022-04-26 DIAGNOSIS — Z97.5 PRESENCE OF (INTRAUTERINE) CONTRACEPTIVE DEVICE: ICD-10-CM

## 2022-04-26 PROCEDURE — 99213 OFFICE O/P EST LOW 20 MIN: CPT

## 2022-04-29 NOTE — PROCEDURE
[Locate IUD] : locate IUD [Transvaginal Ultrasound] : transvaginal ultrasound [Anteverted] : anteverted [FreeTextEntry3] : iud found in good intrauterine position

## 2022-04-29 NOTE — PHYSICAL EXAM
[Appropriately responsive] : appropriately responsive [Alert] : alert [No Acute Distress] : no acute distress [Soft] : soft [Non-tender] : non-tender [Non-distended] : non-distended [No Mass] : no mass [Labia Majora] : normal [IUD String] : an IUD string was noted [Normal] : normal [Uterine Adnexae] : normal

## 2022-04-29 NOTE — PLAN
[FreeTextEntry1] : various issues discussed:\par \par 1 post op from D and C with andrés chairez, healing well, some spotting,no fever/ abd pain\par \par 2. nipt noted trisomy 21: this discussed at length, pt had questions about how pertained to future pregnancy but explained usually age related not hereditary\par \par 3. birth control: iud placed at  d and c, was found to be in good position, issues related to iud discussed

## 2022-06-13 ENCOUNTER — APPOINTMENT (OUTPATIENT)
Dept: OBGYN | Facility: CLINIC | Age: 37
End: 2022-06-13
Payer: COMMERCIAL

## 2022-06-13 ENCOUNTER — NON-APPOINTMENT (OUTPATIENT)
Age: 37
End: 2022-06-13

## 2022-06-13 VITALS
HEIGHT: 67 IN | SYSTOLIC BLOOD PRESSURE: 138 MMHG | DIASTOLIC BLOOD PRESSURE: 82 MMHG | BODY MASS INDEX: 28.09 KG/M2 | WEIGHT: 179 LBS

## 2022-06-13 PROCEDURE — 99395 PREV VISIT EST AGE 18-39: CPT

## 2022-06-14 LAB
HBV SURFACE AG SER QL: NONREACTIVE
HCV AB SER QL: NONREACTIVE
HCV S/CO RATIO: 0.23 S/CO
HIV1+2 AB SPEC QL IA.RAPID: NONREACTIVE
T PALLIDUM AB SER QL IA: NEGATIVE

## 2022-06-16 LAB
C TRACH RRNA SPEC QL NAA+PROBE: NOT DETECTED
CANDIDA VAG CYTO: NOT DETECTED
G VAGINALIS+PREV SP MTYP VAG QL MICRO: DETECTED
HPV HIGH+LOW RISK DNA PNL CVX: NOT DETECTED
N GONORRHOEA RRNA SPEC QL NAA+PROBE: NOT DETECTED
SOURCE TP AMPLIFICATION: NORMAL
T VAGINALIS VAG QL WET PREP: NOT DETECTED

## 2022-06-16 RX ORDER — METRONIDAZOLE 500 MG/1
500 TABLET ORAL TWICE DAILY
Qty: 14 | Refills: 3 | Status: ACTIVE | COMMUNITY
Start: 2022-06-16 | End: 1900-01-01

## 2022-06-16 NOTE — PHYSICAL EXAM
[Chaperone Present] : A chaperone was present in the examining room during all aspects of the physical examination [Appropriately responsive] : appropriately responsive [Alert] : alert [No Lymphadenopathy] : no lymphadenopathy [Regular Rate Rhythm] : regular rate rhythm [Soft] : soft [Non-tender] : non-tender [Non-distended] : non-distended [No Mass] : no mass [Oriented x3] : oriented x3 [Labia Majora] : normal [Labia Minora] : normal [Normal] : normal [Normal Position] : in a normal position [Uterine Adnexae] : normal

## 2022-06-17 LAB — CYTOLOGY CVX/VAG DOC THIN PREP: NORMAL

## 2023-08-01 ENCOUNTER — APPOINTMENT (OUTPATIENT)
Dept: OBGYN | Facility: CLINIC | Age: 38
End: 2023-08-01
Payer: COMMERCIAL

## 2023-08-01 VITALS — DIASTOLIC BLOOD PRESSURE: 84 MMHG | OXYGEN SATURATION: 100 % | HEART RATE: 81 BPM | SYSTOLIC BLOOD PRESSURE: 123 MMHG

## 2023-08-01 DIAGNOSIS — Z01.419 ENCOUNTER FOR GYNECOLOGICAL EXAMINATION (GENERAL) (ROUTINE) W/OUT ABNORMAL FINDINGS: ICD-10-CM

## 2023-08-01 DIAGNOSIS — Z11.3 ENCOUNTER FOR SCREENING FOR INFECTIONS WITH A PREDOMINANTLY SEXUAL MODE OF TRANSMISSION: ICD-10-CM

## 2023-08-01 DIAGNOSIS — R53.83 OTHER FATIGUE: ICD-10-CM

## 2023-08-01 PROCEDURE — 36415 COLL VENOUS BLD VENIPUNCTURE: CPT

## 2023-08-01 PROCEDURE — 99395 PREV VISIT EST AGE 18-39: CPT

## 2023-08-01 NOTE — PHYSICAL EXAM
[Chaperone Present] : A chaperone was present in the examining room during all aspects of the physical examination [Appropriately responsive] : appropriately responsive [Alert] : alert [No Acute Distress] : no acute distress [No Lymphadenopathy] : no lymphadenopathy [Regular Rate Rhythm] : regular rate rhythm [Clear to Auscultation B/L] : clear to auscultation bilaterally [Soft] : soft [Non-tender] : non-tender [Non-distended] : non-distended [No Mass] : no mass [FreeTextEntry6] : multiple skin nodules in breasts which are her steatosis, hard to examine, refer back to breast surgeon, imaging ordered [Examination Of The Breasts] : a normal appearance [Labia Majora] : normal [Labia Minora] : normal [IUD String] : an IUD string was noted [Normal] : normal [Uterine Adnexae] : normal

## 2023-08-01 NOTE — PLAN
[FreeTextEntry1] : annual: pap and hpv and gc/ct and sti tests  feeling tired , likely due to stress of single parenting but wants various blood work plus sti screens done  breasts very difficult to examine due to skin condition producing nodules in skin, told to see her breast surgeon again and get breast imaging at same place she has before (she thinks it is [facility])  iud in place, she thinks it is mirena, placed after vtop last yr, issues discussed

## 2023-08-01 NOTE — REVIEW OF SYSTEMS
[Negative] : Heme/Lymph [FreeTextEntry6] : had cold recently [de-identified] : steatosis nodule on skin (arms) acne

## 2023-08-01 NOTE — HISTORY OF PRESENT ILLNESS
[Normal Amount/Duration] :  normal amount and duration [Currently Active] : currently active [Men] : men [No] : No [Yes] : Yes [FreeTextEntry1] : 07/29/2023 [FreeTextEntry3] : IUD

## 2023-08-02 LAB
ALBUMIN SERPL ELPH-MCNC: 4.8 G/DL
ALP BLD-CCNC: 56 U/L
ALT SERPL-CCNC: 21 U/L
ANION GAP SERPL CALC-SCNC: 11 MMOL/L
AST SERPL-CCNC: 24 U/L
BILIRUB SERPL-MCNC: 0.2 MG/DL
BUN SERPL-MCNC: 11 MG/DL
CALCIUM SERPL-MCNC: 9.4 MG/DL
CHLORIDE SERPL-SCNC: 104 MMOL/L
CO2 SERPL-SCNC: 24 MMOL/L
CORTIS SERPL-MCNC: 9 UG/DL
CREAT SERPL-MCNC: 0.86 MG/DL
EGFR: 88 ML/MIN/1.73M2
GLUCOSE SERPL-MCNC: 94 MG/DL
HBV SURFACE AG SER QL: NONREACTIVE
HCT VFR BLD CALC: 42.8 %
HCV AB SER QL: NONREACTIVE
HCV S/CO RATIO: 0.18 S/CO
HGB BLD-MCNC: 12.6 G/DL
HIV1+2 AB SPEC QL IA.RAPID: NONREACTIVE
MCHC RBC-ENTMCNC: 27 PG
MCHC RBC-ENTMCNC: 29.4 GM/DL
MCV RBC AUTO: 91.8 FL
PLATELET # BLD AUTO: 215 K/UL
POTASSIUM SERPL-SCNC: 4.5 MMOL/L
PROT SERPL-MCNC: 7.8 G/DL
RBC # BLD: 4.66 M/UL
RBC # FLD: 13.4 %
SODIUM SERPL-SCNC: 138 MMOL/L
T PALLIDUM AB SER QL IA: NEGATIVE
T4 FREE SERPL-MCNC: 1.1 NG/DL
TSH SERPL-ACNC: 1.18 UIU/ML
WBC # FLD AUTO: 3.35 K/UL

## 2023-08-03 LAB
C TRACH RRNA SPEC QL NAA+PROBE: NOT DETECTED
HPV HIGH+LOW RISK DNA PNL CVX: NOT DETECTED
N GONORRHOEA RRNA SPEC QL NAA+PROBE: NOT DETECTED
SOURCE TP AMPLIFICATION: NORMAL

## 2023-08-07 LAB — CYTOLOGY CVX/VAG DOC THIN PREP: NORMAL

## 2025-04-02 NOTE — DISCHARGE NOTE OB - NS AS DC FU INST LIST INST
Managing the Challenge of Quitting Smoking  Quitting smoking is a physical and mental challenge. You may have cravings, withdrawal symptoms, and temptation to smoke. Before quitting, work with your health care provider to make a plan that can help you manage quitting. Making a plan before you quit may keep you from smoking when you have the urge to smoke while trying to quit.  How to manage lifestyle changes  Managing stress  Stress can make you want to smoke, and wanting to smoke may cause stress. It is important to find ways to manage your stress. You could try some of the following:  Practice relaxation techniques.  Breathe slowly and deeply, in through your nose and out through your mouth.  Listen to music.  Soak in a bath or take a shower.  Imagine a peaceful place or vacation.  Get some support.  Talk with family or friends about your stress.  Join a support group.  Talk with a counselor or therapist.  Get some physical activity.  Go for a walk, run, or bike ride.  Play a favorite sport.  Practice yoga.    Medicines  Talk with your health care provider about medicines that might help you deal with cravings and make quitting easier for you.  Relationships  Social situations can be difficult when you are quitting smoking. To manage this, you can:  Avoid parties and other social situations where people might be smoking.  Avoid alcohol.  Leave right away if you have the urge to smoke.  Explain to your family and friends that you are quitting smoking. Ask for support and let them know you might be a bit grumpy.  Plan activities where smoking is not an option.  General instructions  Be aware that many people gain weight after they quit smoking. However, not everyone does. To keep from gaining weight, have a plan in place before you quit, and stick to the plan after you quit. Your plan should include:  Eating healthy snacks. When you have a craving, it may help to:  Eat popcorn, or try carrots, celery, or other cut  vegetables.  Chew sugar-free gum.  Changing how you eat.  Eat small portion sizes at meals.  Eat 4-6 small meals throughout the day instead of 1-2 large meals a day.  Be mindful when you eat. You should avoid watching television or doing other things that might distract you as you eat.  Exercising regularly.  Make time to exercise each day. If you do not have time for a long workout, do short bouts of exercise for 5-10 minutes several times a day.  Do some form of strengthening exercise, such as weight lifting.  Do some exercise that gets your heart beating and causes you to breathe deeply, such as walking fast, running, swimming, or biking. This is very important.  Drinking plenty of water or other low-calorie or no-calorie drinks. Drink enough fluid to keep your urine pale yellow.    How to recognize withdrawal symptoms  Your body and mind may experience discomfort as you try to get used to not having nicotine in your system. These effects are called withdrawal symptoms. They may include:  Feeling hungrier than normal.  Having trouble concentrating.  Feeling irritable or restless.  Having trouble sleeping.  Feeling depressed.  Craving a cigarette.  These symptoms may surprise you, but they are normal to have when quitting smoking.  To manage withdrawal symptoms:  Avoid places, people, and activities that trigger your cravings.  Remember why you want to quit.  Get plenty of sleep.  Avoid coffee and other drinks that contain caffeine. These may worsen some of your symptoms.  How to manage cravings  Come up with a plan for how to deal with your cravings. The plan should include the following:  A definition of the specific situation you want to deal with.  An activity or action you will take to replace smoking.  A clear idea for how this action will help.  The name of someone who could help you with this.  Cravings usually last for 5-10 minutes. Consider taking the following actions to help you with your plan to deal  with cravings:  Keep your mouth busy.  Chew sugar-free gum.  Suck on hard candies or a straw.  Brush your teeth.  Keep your hands and body busy.  Change to a different activity right away.  Squeeze or play with a ball.  Do an activity or a hobby, such as making bead jewelry, practicing needlepoint, or working with wood.  Mix up your normal routine.  Take a short exercise break. Go for a quick walk, or run up and down stairs.  Focus on doing something kind or helpful for someone else.  Call a friend or family member to talk during a craving.  Join a support group.  Contact a quitline.  Where to find support  To get help or find a support group:  Call the [employer] Smoking Quitline: -356-QUIT-NOW (524-8213)  Text QUIT to SmokefreeTXT: 801246  Where to find more information  Visit these websites to find more information on quitting smoking:  U.[place]. Department of Health and Human Services: www.smokefree.gov  American Lung Association: www.freedomfromsmoking.org  Centers for Disease Control and Prevention (CDC): www.cdc.gov  American Heart Association: www.heart.org  Contact a health care provider if:  You want to change your plan for quitting.  The medicines you are taking are not helping.  Your eating feels out of control or you cannot sleep.  You feel depressed or become very anxious.  Summary  Quitting smoking is a physical and mental challenge. You will face cravings, withdrawal symptoms, and temptation to smoke again. Preparation can help you as you go through these challenges.  Try different techniques to manage stress, handle social situations, and prevent weight gain.  You can deal with cravings by keeping your mouth busy (such as by chewing gum), keeping your hands and body busy, calling family or friends, or contacting a quitline for people who want to quit smoking.  You can deal with withdrawal symptoms by avoiding places where people smoke, getting plenty of rest, and avoiding drinks that  contain caffeine.  This information is not intended to replace advice given to you by your health care provider. Make sure you discuss any questions you have with your health care provider.  Document Revised: 12/09/2022 Document Reviewed: 12/09/2022  [employer] Patient Education © 2024 [employer]Chronic Obstructive Pulmonary Disease Exacerbation    Chronic obstructive pulmonary disease (COPD) is a long-term (chronic) lung problem.  When you have COPD, it can feel harder to breathe in or out.  COPD exacerbation is a flare-up of symptoms when breathing gets worse and more treatment may be needed. Without treatment, flare-ups can be life-threatening. If they happen often, your lungs can become more damaged.  What are the causes?  Not taking your usual COPD medicines as told by your health care provider.  A cold or the flu, which can cause infection in your lungs.  Being exposed to things that make your breathing worse, such as:  Smoke.  Air pollution.  Fumes.  Dust.  Allergies.  Weather changes.  What are the signs or symptoms?  Symptoms do not get better or get worse even if you take your medicines as told by your provider. Symptoms may include:  More shortness of breath. You may only be able to speak one or two words at a time.  More coughing or mucus from your lungs.  More wheezing or chest tightness.  Being more tired and having less energy.  Confusion.  How is this diagnosed?  This condition is diagnosed based on:  Symptoms that get worse.  Your medical history.  A physical exam.  You may also have tests, including:  A chest X-ray.  Blood or mucus tests.  How is this treated?  You may be able to stay home or you may need to go to the hospital. Treatment may include:  Taking medicines. These may include:  Inhalers. These have medicines in them that you breathe in. These may be more of what you already take or they may be new.  Steroids. These reduce inflammation in the airways. These may be inhaled, taken by  mouth, or given in an IV.  Antibiotics. These treat infection.  Using oxygen.  Using a device to help you clear mucus.  Follow these instructions at home:  Medicines  Take your medicines only as told by your provider.  If you were given antibiotics or steroids, take them as told by your provider. Do not stop taking them even if you start to feel better.  Lifestyle  Several times a day, wash your hands with soap and water for at least 20 seconds.  If you cannot use soap and water, use hand .  This may help keep you from getting an infection.  Avoid being around crowds or people who are sick.  Do not smoke or use any products that contain nicotine or tobacco. If you need help quitting, ask your provider.  Return to your normal activities when your provider says that it's safe.  Use breathing methods to control your stress and catch your breath.  How is this prevented?  Follow your COPD action plan. The action plan tells you what to do if you're feeling good and what to do when you start feeling worse. Discuss the plan often with your provider.  Make sure you get all the shots, also called vaccines, that your provider recommends. Ask your provider about a flu shot and a pneumonia shot.  Use oxygen therapy if told by your provider. If you need home oxygen therapy, ask your provider how often to check your oxygen level with a device called an oximeter.  Keep all follow-up visits to review your COPD action plan. Your provider will want to check on your condition often to keep you healthy and out of the hospital.  Contact a health care provider if:  Your COPD symptoms get worse.  You have a fever or chills.  You have trouble doing daily activities.  You have trouble breathing even when you are resting.  Get help right away if:  You are short of breath and cannot:  Talk in full sentences.  Do normal activities.  You have chest pain.  You feel confused.  These symptoms may be an emergency. Call 911 right away.  Do  not wait to see if the symptoms will go away.  Do not drive yourself to the hospital.  This information is not intended to replace advice given to you by your health care provider. Make sure you discuss any questions you have with your health care provider.  Document Revised: 09/19/2024 Document Reviewed: 03/04/2024  [employer] Patient Education © 2024 [employer]Chronic Obstructive Pulmonary Disease Exacerbation    Chronic obstructive pulmonary disease (COPD) is a long-term (chronic) lung problem.  When you have COPD, it can feel harder to breathe in or out.  COPD exacerbation is a flare-up of symptoms when breathing gets worse and more treatment may be needed. Without treatment, flare-ups can be life-threatening. If they happen often, your lungs can become more damaged.  What are the causes?  Not taking your usual COPD medicines as told by your health care provider.  A cold or the flu, which can cause infection in your lungs.  Being exposed to things that make your breathing worse, such as:  Smoke.  Air pollution.  Fumes.  Dust.  Allergies.  Weather changes.  What are the signs or symptoms?  Symptoms do not get better or get worse even if you take your medicines as told by your provider. Symptoms may include:  More shortness of breath. You may only be able to speak one or two words at a time.  More coughing or mucus from your lungs.  More wheezing or chest tightness.  Being more tired and having less energy.  Confusion.  How is this diagnosed?  This condition is diagnosed based on:  Symptoms that get worse.  Your medical history.  A physical exam.  You may also have tests, including:  A chest X-ray.  Blood or mucus tests.  How is this treated?  You may be able to stay home or you may need to go to the hospital. Treatment may include:  Taking medicines. These may include:  Inhalers. These have medicines in them that you breathe in. These may be more of what you already take or they may be new.  Steroids. These  reduce inflammation in the airways. These may be inhaled, taken by mouth, or given in an IV.  Antibiotics. These treat infection.  Using oxygen.  Using a device to help you clear mucus.  Follow these instructions at home:  Medicines  Take your medicines only as told by your provider.  If you were given antibiotics or steroids, take them as told by your provider. Do not stop taking them even if you start to feel better.  Lifestyle  Several times a day, wash your hands with soap and water for at least 20 seconds.  If you cannot use soap and water, use hand .  This may help keep you from getting an infection.  Avoid being around crowds or people who are sick.  Do not smoke or use any products that contain nicotine or tobacco. If you need help quitting, ask your provider.  Return to your normal activities when your provider says that it's safe.  Use breathing methods to control your stress and catch your breath.  How is this prevented?  Follow your COPD action plan. The action plan tells you what to do if you're feeling good and what to do when you start feeling worse. Discuss the plan often with your provider.  Make sure you get all the shots, also called vaccines, that your provider recommends. Ask your provider about a flu shot and a pneumonia shot.  Use oxygen therapy if told by your provider. If you need home oxygen therapy, ask your provider how often to check your oxygen level with a device called an oximeter.  Keep all follow-up visits to review your COPD action plan. Your provider will want to check on your condition often to keep you healthy and out of the hospital.  Contact a health care provider if:  Your COPD symptoms get worse.  You have a fever or chills.  You have trouble doing daily activities.  You have trouble breathing even when you are resting.  Get help right away if:  You are short of breath and cannot:  Talk in full sentences.  Do normal activities.  You have chest pain.  You feel  confused.  These symptoms may be an emergency. Call 911 right away.  Do not wait to see if the symptoms will go away.  Do not drive yourself to the hospital.  This information is not intended to replace advice given to you by your health care provider. Make sure you discuss any questions you have with your health care provider.  Document Revised: 09/19/2024 Document Reviewed: 03/04/2024  [employer] Patient Education © 2024 [employer]   no